# Patient Record
Sex: FEMALE | Race: WHITE | NOT HISPANIC OR LATINO | Employment: OTHER | URBAN - METROPOLITAN AREA
[De-identification: names, ages, dates, MRNs, and addresses within clinical notes are randomized per-mention and may not be internally consistent; named-entity substitution may affect disease eponyms.]

---

## 2017-01-30 RX ORDER — DOXAZOSIN 2 MG/1
2 TABLET ORAL DAILY
Qty: 30 TABLET | Refills: 6 | Status: SHIPPED | OUTPATIENT
Start: 2017-01-30 | End: 2017-01-30 | Stop reason: SDUPTHER

## 2017-01-30 RX ORDER — ROSUVASTATIN CALCIUM 10 MG/1
10 TABLET, COATED ORAL DAILY
Qty: 30 TABLET | Refills: 6 | Status: SHIPPED | OUTPATIENT
Start: 2017-01-30 | End: 2017-02-01 | Stop reason: SDUPTHER

## 2017-01-30 RX ORDER — RAMIPRIL 10 MG/1
10 CAPSULE ORAL 2 TIMES DAILY
Qty: 60 CAPSULE | Refills: 6 | Status: SHIPPED | OUTPATIENT
Start: 2017-01-30 | End: 2017-05-30 | Stop reason: SDUPTHER

## 2017-01-30 RX ORDER — DOXAZOSIN 2 MG/1
TABLET ORAL DAILY
COMMUNITY
Start: 2016-02-02 | End: 2017-01-30 | Stop reason: SDUPTHER

## 2017-02-01 RX ORDER — ROSUVASTATIN CALCIUM 10 MG/1
TABLET, COATED ORAL
Qty: 30 TABLET | Refills: 6 | Status: SHIPPED | OUTPATIENT
Start: 2017-02-01 | End: 2017-09-05 | Stop reason: SDUPTHER

## 2017-02-01 RX ORDER — DOXAZOSIN 2 MG/1
TABLET ORAL
Qty: 90 TABLET | Refills: 3 | Status: SHIPPED | OUTPATIENT
Start: 2017-02-01 | End: 2017-09-25 | Stop reason: SDUPTHER

## 2017-02-01 RX ORDER — RAMIPRIL 10 MG/1
CAPSULE ORAL
Qty: 60 CAPSULE | Refills: 7 | Status: SHIPPED | OUTPATIENT
Start: 2017-02-01 | End: 2017-02-20

## 2017-02-20 ENCOUNTER — OFFICE VISIT (OUTPATIENT)
Dept: CARDIOLOGY | Facility: CLINIC | Age: 82
End: 2017-02-20

## 2017-02-20 VITALS
WEIGHT: 168 LBS | SYSTOLIC BLOOD PRESSURE: 174 MMHG | DIASTOLIC BLOOD PRESSURE: 80 MMHG | HEIGHT: 63 IN | HEART RATE: 74 BPM | BODY MASS INDEX: 29.77 KG/M2

## 2017-02-20 DIAGNOSIS — R00.2 PALPITATIONS: ICD-10-CM

## 2017-02-20 DIAGNOSIS — I25.10 CORONARY ARTERY DISEASE INVOLVING NATIVE CORONARY ARTERY OF NATIVE HEART WITHOUT ANGINA PECTORIS: Primary | ICD-10-CM

## 2017-02-20 DIAGNOSIS — I10 BENIGN HYPERTENSION: ICD-10-CM

## 2017-02-20 PROCEDURE — 99213 OFFICE O/P EST LOW 20 MIN: CPT | Performed by: PHYSICIAN ASSISTANT

## 2017-02-20 RX ORDER — DICLOFENAC SODIUM 75 MG/1
75 TABLET, DELAYED RELEASE ORAL AS NEEDED
COMMUNITY
End: 2018-10-15

## 2017-02-20 RX ORDER — LEVOTHYROXINE AND LIOTHYRONINE 38; 9 UG/1; UG/1
60 TABLET ORAL DAILY
COMMUNITY
Start: 2015-12-21

## 2017-02-20 RX ORDER — HYDROCHLOROTHIAZIDE 25 MG/1
25 TABLET ORAL DAILY
COMMUNITY
End: 2019-07-22

## 2017-02-20 NOTE — PROGRESS NOTES
[facility] - Office Note  Noé Patton         [address] [place] [address]  1935   [phone] (home)      LOCATION:  [facility].  Visit Type: Follow Up.    PCP:  Keron Rodarte MD    02/20/17   Noé Patton is a 82 y.o.  female  retired.      Chief Complaint: Follow up on CAD, HTN.  PROBLEM LIST:  1. Coronary artery disease:  a. ACS status post catheterization, 01/29/2012: Two 3.5 x 28 mm Xience drug-eluting stents of the proximal RCA, EF 30%.  b. Echocardiogram, 03/12/2012: EF 50% to 55%. Mild MR and moderate TR, RVSP at 46, left atrium 3.8 cm.  c. Cessation of Plavix secondary to epistaxis.  d. Echocardiogram 02/09/2016: EF 55% to 60%, mild MR, mild TR, no AS.   e. Nuclear Cardiolite 02/11/2016: No evidence of inducible ischemia, normal LV function.   2. Benign hypertension.  3. Type 2 diabetes mellitus.  4. Hypothyroidism.  5. History of tobacco, quitting several years ago.  6. Family history.  7. Palpitations:   a. Holter monitor 01/21/2016, showing PACs, PVCs, but no arrhythmias or pauses.   8. Osteoarthritis.  9. Surgical history:  a. Benign left breast biopsy.  b. Right cataract.         No Known Allergies      Current Outpatient Prescriptions:   •  doxazosin (CARDURA) 2 MG tablet, take 1 tablet by mouth once daily as directed, Disp: 90 tablet, Rfl: 3  •  metoprolol tartrate (LOPRESSOR) 25 MG tablet, Take 1 tablet by mouth 2 (Two) Times a Day., Disp: 180 tablet, Rfl: 1  •  metoprolol tartrate (LOPRESSOR) 25 MG tablet, take 1 tablet by mouth twice a day, Disp: 180 tablet, Rfl: 0  •  ramipril (ALTACE) 10 MG capsule, Take 1 capsule by mouth 2 (Two) Times a Day., Disp: 60 capsule, Rfl: 6  •  ramipril (ALTACE) 10 MG capsule, take 1 capsule by mouth twice a day, Disp: 60 capsule, Rfl: 7  •  rosuvastatin (CRESTOR) 10 MG tablet, take 1 tablet by mouth once daily, Disp: 30 tablet, Rfl: 6    HPI  Ms. Patton is here for routine follow-up on her  "heart disease, hypertension and palpitations.  She is done fairly well from a cardiac standpoint.  She still has palpitations although there are very minimal.  She denies any dizziness or syncopal events.  She states her blood pressure continues to remain high at home, usually running 140s to 150s systolically.  She has other noncardiac-related complaints such as arthritis, and intends to see her PCP for this.  She has had some swelling of her lower extremities over the last few months.  She has no weeping, no sores or ulcerations.      The following portions of the patient's history were reviewed in the chart and updated as appropriate: allergies, current medications, past family history, past medical history, past social history, past surgical history and problem list.    Review of Systems   Cardiovascular: Positive for leg swelling and palpitations.   All other systems reviewed and are negative.        height is 63\" (160 cm) and weight is 168 lb (76.2 kg). Her blood pressure is 174/80 and her pulse is 74.   Physical Exam   Constitutional: Vital signs are normal. She appears well-developed and well-nourished.   Cardiovascular: Normal rate, regular rhythm, S1 normal, S2 normal, normal heart sounds, intact distal pulses and normal pulses.    Pulmonary/Chest: Effort normal and breath sounds normal. She has no wheezes. She has no rhonchi. She has no rales.   Abdominal: Soft. Normal appearance and bowel sounds are normal. There is no hepatosplenomegaly.   Neurological: She is alert.         Procedures     Assessment/ Plan     Coronary artery disease involving native coronary artery of native heart without angina pectoris:  Stable and asymptomatic.  Continue statin therapy with routine bloodwork by PCP.  RTC 6 months or sooner PRN.    Benign hypertension:  Poorly controlled.  I offered to increase her diuretic with the complaint of increased swelling and poorly controlled hypertension.  She is uneasy about this.  " Increase altace to 20 mg po daily as she believes she is only taking a total of 10 mg daily despite the med rec same twice a day.  Keep all other meds as directed.  Keep BP log x 1 week and we will call pt to discuss.  I anticipate her needing better control - consider increasing cardura or discuss increasing beta blocker although i do not think she will tolerate the fatigue.    Palpitations:  Stable. Continue beta blocker, continue to monitor.    Mariposa Dougherty PA-C  2/20/2017 10:14 AM      EMR Dragon/Transcription disclaimer:   Much of this encounter note is an electronic transcription/translation of spoken language to printed text. The electronic translation of spoken language may permit erroneous, or at times, nonsensical words or phrases to be inadvertently transcribed; Although I have reviewed the note for such errors, some may still exist.

## 2017-02-27 ENCOUNTER — TELEPHONE (OUTPATIENT)
Dept: CARDIOLOGY | Facility: CLINIC | Age: 82
End: 2017-02-27

## 2017-02-27 NOTE — TELEPHONE ENCOUNTER
Spoke with Patient to check her B/P readings, 2/21 /60, PM, 163/69,  2/22 /62, /63,  2/23 /56, /66, 2/24  /58,  PM  128/58, 2/25 / 53,  /54,  2/26 /68, before medication,  /59,  2/27 /61.   Currently taking Ramipril 20 mg Q AM.  No changes for now  per Mariposa Case. Patient to call our office with any concerns or problems. Patient verbalized understanding.

## 2017-05-30 RX ORDER — RAMIPRIL 10 MG/1
10 CAPSULE ORAL 2 TIMES DAILY
Qty: 60 CAPSULE | Refills: 0 | Status: SHIPPED | OUTPATIENT
Start: 2017-05-30 | End: 2017-06-27 | Stop reason: SDUPTHER

## 2017-06-27 RX ORDER — RAMIPRIL 10 MG/1
10 CAPSULE ORAL 2 TIMES DAILY
Qty: 60 CAPSULE | Refills: 3 | Status: SHIPPED | OUTPATIENT
Start: 2017-06-27 | End: 2017-09-25 | Stop reason: SDUPTHER

## 2017-09-05 RX ORDER — ROSUVASTATIN CALCIUM 10 MG/1
10 TABLET, COATED ORAL DAILY
Qty: 30 TABLET | Refills: 6 | Status: SHIPPED | OUTPATIENT
Start: 2017-09-05 | End: 2018-03-15 | Stop reason: SDUPTHER

## 2017-09-25 ENCOUNTER — OFFICE VISIT (OUTPATIENT)
Dept: CARDIOLOGY | Facility: CLINIC | Age: 82
End: 2017-09-25

## 2017-09-25 VITALS
HEIGHT: 63 IN | SYSTOLIC BLOOD PRESSURE: 140 MMHG | HEART RATE: 73 BPM | DIASTOLIC BLOOD PRESSURE: 66 MMHG | WEIGHT: 163.4 LBS | BODY MASS INDEX: 28.95 KG/M2

## 2017-09-25 DIAGNOSIS — I10 BENIGN HYPERTENSION: ICD-10-CM

## 2017-09-25 DIAGNOSIS — I25.10 CORONARY ARTERY DISEASE INVOLVING NATIVE CORONARY ARTERY OF NATIVE HEART WITHOUT ANGINA PECTORIS: Primary | ICD-10-CM

## 2017-09-25 DIAGNOSIS — E78.2 MIXED HYPERLIPIDEMIA: ICD-10-CM

## 2017-09-25 PROCEDURE — 99214 OFFICE O/P EST MOD 30 MIN: CPT | Performed by: INTERNAL MEDICINE

## 2017-09-25 RX ORDER — DOXAZOSIN 2 MG/1
2 TABLET ORAL NIGHTLY
Qty: 90 TABLET | Refills: 3 | Status: SHIPPED | OUTPATIENT
Start: 2017-09-25 | End: 2018-10-15

## 2017-09-25 RX ORDER — RAMIPRIL 10 MG/1
10 CAPSULE ORAL 2 TIMES DAILY
Qty: 180 CAPSULE | Refills: 3 | Status: SHIPPED | OUTPATIENT
Start: 2017-09-25 | End: 2020-08-03

## 2017-09-25 NOTE — PROGRESS NOTES
[facility] [place]  Office visit  Noé Patton  1935  [phone]    VISIT DATE:  09/25/2017    PCP: Keron Rodarte MD  [address] [facility] [address]    CC:  Chief Complaint   Patient presents with   • Coronary Artery Disease     follow up       PROBLEM LIST:  1. Coronary artery disease:  a. ACS status post catheterization, 01/29/2012: Two 3.5 x 28 mm Xience drug-eluting stents of the proximal RCA, EF 30%.  b. Echocardiogram, 03/12/2012: EF 50% to 55%. Mild MR and moderate TR, RVSP at 46, left atrium 3.8 cm.  c. Cessation of Plavix secondary to epistaxis.  d. Echocardiogram 02/09/2016: EF 55% to 60%, mild MR, mild TR, no AS.   e. Nuclear Cardiolite 02/11/2016: No evidence of inducible ischemia, normal LV function.   2. Benign hypertension.  3. Type 2 diabetes mellitus.  4. Hypothyroidism.  5. History of tobacco, quitting several years ago.  6. Family history.  7. Palpitations:   a. Holter monitor 01/21/2016, showing PACs, PVCs, but no arrhythmias or pauses.   8. Osteoarthritis.  9. Surgical history:  a. Benign left breast biopsy.  b. Right cataract      ASSESSMENT:   Diagnosis Plan   1. Coronary artery disease involving native coronary artery of native heart without angina pectoris     2. Benign hypertension     3. Mixed hyperlipidemia         PLAN:  Coronary artery disease: Stable.  Continue aspirin, beta blockade and statin therapy.    Hypertension:: Less than 140/90.  Currently controlled.  Continue combination of Cardura, hydrochlorothiazide, metoprolol and ace inhibitor    Crestor: Goal LDL less than 100.  Currently controlled.  Continue Crestor 10 mg by mouth daily.    Subjective  Reports stable functional capacity.  Denies chest pain, palpitations, or dyspnea on exertion.  Compliant with medical therapy.  Blood pressures running less than 140/90 mmHg.  Has noticed some mild intermittent calf myalgias since switching to generic rosuvastatin.  Currently not  "limiting her functional capacity.  Denies easy bruising or bleeding complications on aspirin.  Reviewed recent fasting lipid panel.        PHYSICAL EXAMINATION:  Vitals:    09/25/17 1113   BP: 140/66   BP Location: Right arm   Patient Position: Sitting   Pulse: 73   Weight: 163 lb 6.4 oz (74.1 kg)   Height: 63\" (160 cm)     General Appearance:    Alert, cooperative, no distress, appears stated age   Head:    Normocephalic, without obvious abnormality, atraumatic   Eyes:    conjunctiva/corneas clear   Nose:   Nares normal, septum midline, mucosa normal, no drainage   Throat:   Lips, teeth and gums normal   Neck:   Supple, symmetrical, trachea midline, no carotid    bruit or JVD   Lungs:     Clear to auscultation bilaterally, respirations unlabored   Chest Wall:    No tenderness or deformity    Heart:    Regular rate and rhythm, S1 and S2 normal, 2/6 early peaking systolic murmur right upper sternal border, rub   or gallop, normal carotid impulse bilaterally without bruit.   Abdomen:     Soft, non-tender   Extremities:   Extremities normal, atraumatic, no cyanosis or edema   Pulses:   2+ and symmetric all extremities   Skin:   Skin color, texture, turgor normal, no rashes or lesions, Spider veins noted on bilateral lower extremities.         Diagnostic Data:  Procedures  No results found for: CHLPL, TRIG, HDL, LDLDIRECT  No results found for: GLUCOSE, BUN, CREATININE, NA, K, CL, CO2, CREATININE, BUN  No results found for: HGBA1C  No results found for: WBC, HGB, HCT, PLT    Allergies  No Known Allergies    Current Medications    Current Outpatient Prescriptions:   •  aspirin 81 MG tablet, Take 81 mg by mouth Daily., Disp: , Rfl:   •  diclofenac (VOLTAREN) 75 MG EC tablet, Take 75 mg by mouth As Needed., Disp: , Rfl:   •  doxazosin (CARDURA) 2 MG tablet, Take 1 tablet by mouth Every Night., Disp: 90 tablet, Rfl: 3  •  hydrochlorothiazide (HYDRODIURIL) 25 MG tablet, Take 25 mg by mouth Daily., Disp: , Rfl:   •  metFORMIN " (GLUCOPHAGE) 1000 MG tablet, Take 1,000 mg by mouth 2 (Two) Times a Day With Meals., Disp: , Rfl:   •  metoprolol tartrate (LOPRESSOR) 25 MG tablet, Take 1 tablet by mouth 2 (Two) Times a Day., Disp: 180 tablet, Rfl: 0  •  ramipril (ALTACE) 10 MG capsule, Take 1 capsule by mouth 2 (Two) Times a Day., Disp: 180 capsule, Rfl: 3  •  rosuvastatin (CRESTOR) 10 MG tablet, Take 1 tablet by mouth Daily., Disp: 30 tablet, Rfl: 6  •  Thyroid (ARMOUR THYROID) 60 MG PO tablet, Take 60 mg by mouth Daily., Disp: , Rfl:           ROS  Review of Systems   Cardiovascular: Positive for leg swelling. Negative for chest pain, claudication and dyspnea on exertion.   Respiratory: Negative for cough and shortness of breath.          SOCIAL HX  Social History     Social History   • Marital status:      Spouse name: N/A   • Number of children: N/A   • Years of education: N/A     Occupational History   • Not on file.     Social History Main Topics   • Smoking status: Never Smoker   • Smokeless tobacco: Never Used   • Alcohol use No   • Drug use: No   • Sexual activity: Defer     Other Topics Concern   • Not on file     Social History Narrative       FAMILY HX  Family History   Problem Relation Age of Onset   • Cancer Mother    • Heart attack Father              Josef Hamilton III, MD, FACC

## 2018-03-15 RX ORDER — ROSUVASTATIN CALCIUM 10 MG/1
10 TABLET, COATED ORAL DAILY
Qty: 30 TABLET | Refills: 3 | Status: SHIPPED | OUTPATIENT
Start: 2018-03-15 | End: 2018-07-16 | Stop reason: SDUPTHER

## 2018-04-11 ENCOUNTER — OFFICE VISIT (OUTPATIENT)
Dept: CARDIOLOGY | Facility: CLINIC | Age: 83
End: 2018-04-11

## 2018-04-11 VITALS
HEART RATE: 67 BPM | HEIGHT: 63 IN | DIASTOLIC BLOOD PRESSURE: 60 MMHG | SYSTOLIC BLOOD PRESSURE: 144 MMHG | BODY MASS INDEX: 28 KG/M2 | WEIGHT: 158 LBS

## 2018-04-11 DIAGNOSIS — E78.2 MIXED HYPERLIPIDEMIA: ICD-10-CM

## 2018-04-11 DIAGNOSIS — I10 BENIGN HYPERTENSION: ICD-10-CM

## 2018-04-11 DIAGNOSIS — I25.10 CORONARY ARTERY DISEASE INVOLVING NATIVE CORONARY ARTERY OF NATIVE HEART WITHOUT ANGINA PECTORIS: Primary | ICD-10-CM

## 2018-04-11 DIAGNOSIS — R00.2 PALPITATIONS: ICD-10-CM

## 2018-04-11 PROCEDURE — 99214 OFFICE O/P EST MOD 30 MIN: CPT | Performed by: INTERNAL MEDICINE

## 2018-04-11 RX ORDER — AMLODIPINE BESYLATE 2.5 MG/1
2.5 TABLET ORAL DAILY
Qty: 90 TABLET | Refills: 3 | Status: SHIPPED | OUTPATIENT
Start: 2018-04-11 | End: 2018-10-15

## 2018-04-11 NOTE — PROGRESS NOTES
[facility] at [facility]  Office visit  Noé Patton  1935  [phone]    VISIT DATE:  09/25/2017    PCP: Fatimah Mensah MD  [address] [place] [address] [address] [place] [address]    CC:  Chief Complaint   Patient presents with   • Coronary Artery Disease       PROBLEM LIST:  1. Coronary artery disease:  a. ACS status post catheterization, 01/29/2012: Two 3.5 x 28 mm Xience drug-eluting stents of the proximal RCA, EF 30%.  b. Echocardiogram, 03/12/2012: EF 50% to 55%. Mild MR and moderate TR, RVSP at 46, left atrium 3.8 cm.  c. Cessation of Plavix secondary to epistaxis.  d. Echocardiogram 02/09/2016: EF 55% to 60%, mild MR, mild TR, no AS.   e. Nuclear Cardiolite 02/11/2016: No evidence of inducible ischemia, normal LV function.   2. Benign hypertension.  3. Type 2 diabetes mellitus.  4. Hypothyroidism.  5. History of tobacco, quitting several years ago.  6. Family history.  7. Palpitations:   a. Holter monitor 01/21/2016, showing PACs, PVCs, but no arrhythmias or pauses.   8. Osteoarthritis.  9. Surgical history:  a. Benign left breast biopsy.  b. Right cataract      ASSESSMENT:   Diagnosis Plan   1. Coronary artery disease involving native coronary artery of native heart without angina pectoris     2. Benign hypertension     3. Palpitations     4. Mixed hyperlipidemia         PLAN:  Coronary artery disease: Continue aspirin, beta blockade and statin therapy.Symptoms consistent with mild intermittent class II angina.  Starting amlodipine 2.5 mg by mouth daily.  We'll proceed with more definitive evaluation of coronary anatomy if symptoms do not respond to titration of antianginals.    Hypertension:: Goal less than 130/80 mmHg.  Currently controlled.  Continue combination of Cardura, hydrochlorothiazide, metoprolol and ace inhibitor.  Decreasing Advicor thiazide 12.5 mg by mouth daily.  Adding amlodipine.    Crestor: Goal LDL less than 100.  Currently controlled.  Continue Crestor 10 mg by  "mouth daily.    Subjective  Reports stable functional capacity.  Denies palpitations, or dyspnea on exertion.  Compliant with medical therapy.  Blood pressures running less than 140/90 mmHg.  Has noticed some mild intermittent calf myalgias since switching to generic rosuvastatin, Also feels like she developed some muscle cramping with hydrochlorothiazide at the 25 mg dose, appears to be improved when she takes half a tablet..    Denies easy bruising or bleeding complications on aspirin.  Reviewed recent fasting lipid panel.  Has noticed some intermittent left-sided mild chest pressure which is activities as rushing across the parking lot, resolved with rest with less than 2 minutes.  Occurring intermittently over the previous 3-4 months.       PHYSICAL EXAMINATION:  Vitals:    04/11/18 1142   BP: 144/60   BP Location: Right arm   Patient Position: Sitting   Pulse: 67   Weight: 71.7 kg (158 lb)   Height: 160 cm (63\")     General Appearance:    Alert, cooperative, no distress, appears stated age   Head:    Normocephalic, without obvious abnormality, atraumatic   Eyes:    conjunctiva/corneas clear   Nose:   Nares normal, septum midline, mucosa normal, no drainage   Throat:   Lips, teeth and gums normal   Neck:   Supple, symmetrical, trachea midline, no carotid    bruit or JVD   Lungs:     Clear to auscultation bilaterally, respirations unlabored   Chest Wall:    No tenderness or deformity    Heart:    Regular rate and rhythm, S1 and S2 normal, 2/6 early peaking systolic murmur right upper sternal border, rub   or gallop, normal carotid impulse bilaterally without bruit.   Abdomen:     Soft, non-tender   Extremities:   Extremities normal, atraumatic, no cyanosis or edema   Pulses:   2+ and symmetric all extremities   Skin:   Skin color, texture, turgor normal, no rashes or lesions, Spider veins noted on bilateral lower extremities.         Diagnostic Data:  Procedures  No results found for: CHLPL, TRIG, HDL, " LDLDIRECT  No results found for: GLUCOSE, BUN, CREATININE, NA, K, CL, CO2, CREATININE, BUN  No results found for: HGBA1C  No results found for: WBC, HGB, HCT, PLT    Allergies  No Known Allergies    Current Medications    Current Outpatient Prescriptions:   •  aspirin 81 MG tablet, Take 81 mg by mouth Daily., Disp: , Rfl:   •  diclofenac (VOLTAREN) 75 MG EC tablet, Take 75 mg by mouth As Needed., Disp: , Rfl:   •  doxazosin (CARDURA) 2 MG tablet, Take 1 tablet by mouth Every Night., Disp: 90 tablet, Rfl: 3  •  hydrochlorothiazide (HYDRODIURIL) 25 MG tablet, Take 25 mg by mouth Daily., Disp: , Rfl:   •  metFORMIN (GLUCOPHAGE) 1000 MG tablet, Take 1,000 mg by mouth 2 (Two) Times a Day With Meals., Disp: , Rfl:   •  metoprolol tartrate (LOPRESSOR) 25 MG tablet, Take 1 tablet by mouth Every 12 (Twelve) Hours., Disp: 180 tablet, Rfl: 0  •  ramipril (ALTACE) 10 MG capsule, Take 1 capsule by mouth 2 (Two) Times a Day., Disp: 180 capsule, Rfl: 3  •  rosuvastatin (CRESTOR) 10 MG tablet, Take 1 tablet by mouth Daily., Disp: 30 tablet, Rfl: 3  •  Thyroid (ARMOUR THYROID) 60 MG PO tablet, Take 60 mg by mouth Daily., Disp: , Rfl:           ROS  Review of Systems   Cardiovascular: Positive for leg swelling. Negative for chest pain, claudication and dyspnea on exertion.   Respiratory: Negative for cough and shortness of breath.          SOCIAL HX  Social History     Social History   • Marital status:      Spouse name: N/A   • Number of children: N/A   • Years of education: N/A     Occupational History   • Not on file.     Social History Main Topics   • Smoking status: Never Smoker   • Smokeless tobacco: Never Used   • Alcohol use No   • Drug use: No   • Sexual activity: Defer     Other Topics Concern   • Not on file     Social History Narrative   • No narrative on file       FAMILY HX  Family History   Problem Relation Age of Onset   • Cancer Mother    • Heart attack Father              Josef Hamilton III, MD, [facility]

## 2018-07-16 RX ORDER — ROSUVASTATIN CALCIUM 10 MG/1
10 TABLET, COATED ORAL DAILY
Qty: 30 TABLET | Refills: 3 | Status: SHIPPED | OUTPATIENT
Start: 2018-07-16

## 2018-10-15 ENCOUNTER — OFFICE VISIT (OUTPATIENT)
Dept: CARDIOLOGY | Facility: CLINIC | Age: 83
End: 2018-10-15

## 2018-10-15 VITALS
HEART RATE: 70 BPM | OXYGEN SATURATION: 97 % | SYSTOLIC BLOOD PRESSURE: 160 MMHG | BODY MASS INDEX: 29.84 KG/M2 | DIASTOLIC BLOOD PRESSURE: 60 MMHG | HEIGHT: 60 IN | WEIGHT: 152 LBS

## 2018-10-15 DIAGNOSIS — I25.10 CORONARY ARTERY DISEASE INVOLVING NATIVE CORONARY ARTERY OF NATIVE HEART WITHOUT ANGINA PECTORIS: Primary | ICD-10-CM

## 2018-10-15 DIAGNOSIS — I10 BENIGN HYPERTENSION: ICD-10-CM

## 2018-10-15 DIAGNOSIS — E78.2 MIXED HYPERLIPIDEMIA: ICD-10-CM

## 2018-10-15 PROCEDURE — 99214 OFFICE O/P EST MOD 30 MIN: CPT | Performed by: INTERNAL MEDICINE

## 2018-10-15 RX ORDER — AMLODIPINE BESYLATE 5 MG/1
5 TABLET ORAL DAILY
Qty: 30 TABLET | Refills: 11 | Status: SHIPPED | OUTPATIENT
Start: 2018-10-15 | End: 2020-08-03

## 2018-10-15 NOTE — PROGRESS NOTES
[facility]  Office visit  Noé Patton  1935  [phone]    VISIT DATE:  09/25/2017    PCP: Fatimah Mensah MD  [address] [place] [address] [address] [place] [address]    CC:  Chief Complaint   Patient presents with   • Coronary Artery Disease       PROBLEM LIST:  1. Coronary artery disease:  a. ACS status post catheterization, 01/29/2012: Two 3.5 x 28 mm Xience drug-eluting stents of the proximal RCA, EF 30%.  b. Echocardiogram, 03/12/2012: EF 50% to 55%. Mild MR and moderate TR, RVSP at 46, left atrium 3.8 cm.  c. Cessation of Plavix secondary to epistaxis.  d. Echocardiogram 02/09/2016: EF 55% to 60%, mild MR, mild TR, no AS.   e. Nuclear Cardiolite 02/11/2016: No evidence of inducible ischemia, normal LV function.   2. Benign hypertension.  3. Type 2 diabetes mellitus.  4. Hypothyroidism.  5. History of tobacco, quitting several years ago.  6. Family history.  7. Palpitations:   a. Holter monitor 01/21/2016, showing PACs, PVCs, but no arrhythmias or pauses.   8. Osteoarthritis.  9. Surgical history:  a. Benign left breast biopsy.  b. Right cataract      ASSESSMENT:   Diagnosis Plan   1. Coronary artery disease involving native coronary artery of native heart without angina pectoris     2. Benign hypertension     3. Mixed hyperlipidemia         PLAN:  Coronary artery disease: Continue aspirin, beta blockade and statin therapy.Symptoms consistent with mild intermittent class II angina.  Increasing amlodipine to 5mg by mouth daily.  We'll proceed with more definitive evaluation of coronary anatomy if symptoms do not respond to titration of antianginals.  Follow-up clinical response in 3 months.    Hypertension:: Goal less than 130/80 mmHg.  Currently controlled.  Continue combination of Cardura, hydrochlorothiazide, amlodipine, metoprolol and ace inhibitor.      Crestor: Goal LDL less than 100.  Currently controlled.  Continue Crestor 10 mg by mouth daily.    Subjective  Reports  "stable functional capacity.  Denies palpitations, or dyspnea on exertion.  Compliant with medical therapy.  Blood pressures running less than 140/90 mmHg.  muscle cramping resolved on lower dose hydrochlorothiazide.    Denies easy bruising or bleeding complications on aspirin.  Reviewed recent fasting lipid panel.  Persistent intermittent left-sided mild chest pressure in a stable class II pattern, only notices it when she is in a rush.  Resolves at rest easily..       PHYSICAL EXAMINATION:  Vitals:    10/15/18 1053   BP: 160/60   BP Location: Right arm   Patient Position: Sitting   Pulse: 70   SpO2: 97%   Weight: 68.9 kg (152 lb)   Height: 152.4 cm (60\")     General Appearance:    Alert, cooperative, no distress, appears stated age   Head:    Normocephalic, without obvious abnormality, atraumatic   Eyes:    conjunctiva/corneas clear   Nose:   Nares normal, septum midline, mucosa normal, no drainage   Throat:   Lips, teeth and gums normal   Neck:   Supple, symmetrical, trachea midline, no carotid    bruit or JVD   Lungs:     Clear to auscultation bilaterally, respirations unlabored   Chest Wall:    No tenderness or deformity    Heart:    Regular rate and rhythm, S1 and S2 normal, 2/6 early peaking systolic murmur right upper sternal border, rub   or gallop, normal carotid impulse bilaterally without bruit.   Abdomen:     Soft, non-tender   Extremities:   Extremities normal, atraumatic, no cyanosis or edema   Pulses:   2+ and symmetric all extremities   Skin:   Skin color, texture, turgor normal, no rashes or lesions, Spider veins noted on bilateral lower extremities.         Diagnostic Data:  Procedures  No results found for: CHLPL, TRIG, HDL, LDLDIRECT  No results found for: GLUCOSE, BUN, CREATININE, NA, K, CL, CO2, CREATININE, BUN  No results found for: HGBA1C  No results found for: WBC, HGB, HCT, PLT    Allergies  No Known Allergies    Current Medications    Current Outpatient Prescriptions:   •  amLODIPine " (NORVASC) 2.5 MG tablet, Take 1 tablet by mouth Daily., Disp: 90 tablet, Rfl: 3  •  aspirin 81 MG tablet, Take 81 mg by mouth Daily., Disp: , Rfl:   •  hydrochlorothiazide (HYDRODIURIL) 25 MG tablet, Take 25 mg by mouth Daily., Disp: , Rfl:   •  metoprolol tartrate (LOPRESSOR) 25 MG tablet, Take 1 tablet by mouth Every 12 (Twelve) Hours., Disp: 180 tablet, Rfl: 0  •  ramipril (ALTACE) 10 MG capsule, Take 1 capsule by mouth 2 (Two) Times a Day., Disp: 180 capsule, Rfl: 3  •  rosuvastatin (CRESTOR) 10 MG tablet, Take 1 tablet by mouth Daily., Disp: 30 tablet, Rfl: 3  •  Thyroid (ARMOUR THYROID) 60 MG PO tablet, Take 60 mg by mouth Daily., Disp: , Rfl:           ROS  Review of Systems   Cardiovascular: Positive for leg swelling. Negative for chest pain, claudication and dyspnea on exertion.   Respiratory: Positive for cough. Negative for shortness of breath.          SOCIAL HX  Social History     Social History   • Marital status:      Spouse name: N/A   • Number of children: N/A   • Years of education: N/A     Occupational History   • Not on file.     Social History Main Topics   • Smoking status: Never Smoker   • Smokeless tobacco: Never Used   • Alcohol use No   • Drug use: No   • Sexual activity: Defer     Other Topics Concern   • Not on file     Social History Narrative   • No narrative on file       FAMILY HX  Family History   Problem Relation Age of Onset   • Cancer Mother    • Heart attack Father              Josef Hamilton III, MD, [facility]

## 2019-01-21 ENCOUNTER — OFFICE VISIT (OUTPATIENT)
Dept: CARDIOLOGY | Facility: CLINIC | Age: 84
End: 2019-01-21

## 2019-01-21 VITALS
BODY MASS INDEX: 27.64 KG/M2 | WEIGHT: 156 LBS | HEART RATE: 70 BPM | OXYGEN SATURATION: 96 % | HEIGHT: 63 IN | DIASTOLIC BLOOD PRESSURE: 68 MMHG | SYSTOLIC BLOOD PRESSURE: 140 MMHG

## 2019-01-21 DIAGNOSIS — I10 BENIGN HYPERTENSION: ICD-10-CM

## 2019-01-21 DIAGNOSIS — R07.2 PRECORDIAL PAIN: ICD-10-CM

## 2019-01-21 DIAGNOSIS — E78.2 MIXED HYPERLIPIDEMIA: ICD-10-CM

## 2019-01-21 DIAGNOSIS — I25.10 CORONARY ARTERY DISEASE INVOLVING NATIVE CORONARY ARTERY OF NATIVE HEART WITHOUT ANGINA PECTORIS: Primary | ICD-10-CM

## 2019-01-21 PROCEDURE — 99214 OFFICE O/P EST MOD 30 MIN: CPT | Performed by: INTERNAL MEDICINE

## 2019-01-21 NOTE — PROGRESS NOTES
[facility]  Office visit  Noé Patton  1935  [phone]    VISIT DATE:  09/25/2017    PCP: Fatimah Mensah MD  [address] [place] [address] [address] [place] [address]    CC:  Chief Complaint   Patient presents with   • Coronary Artery Disease       PROBLEM LIST:  1. Coronary artery disease:  a. ACS status post catheterization, 01/29/2012: Two 3.5 x 28 mm Xience drug-eluting stents of the proximal RCA, EF 30%.  b. Echocardiogram, 03/12/2012: EF 50% to 55%. Mild MR and moderate TR, RVSP at 46, left atrium 3.8 cm.  c. Cessation of Plavix secondary to epistaxis.  d. Echocardiogram 02/09/2016: EF 55% to 60%, mild MR, mild TR, no AS.   e. Nuclear Cardiolite 02/11/2016: No evidence of inducible ischemia, normal LV function.   2. Benign hypertension.  3. Type 2 diabetes mellitus.  4. Hypothyroidism.  5. History of tobacco, quitting several years ago.  6. Family history.  7. Palpitations:   a. Holter monitor 01/21/2016, showing PACs, PVCs, but no arrhythmias or pauses.   8. Osteoarthritis.  9. Surgical history:  a. Benign left breast biopsy.  b. Right cataract      ASSESSMENT:   Diagnosis Plan   1. Coronary artery disease involving native coronary artery of native heart without angina pectoris     2. Benign hypertension     3. Mixed hyperlipidemia         PLAN:  Coronary artery disease: Continue aspirin, beta blockade and statin therapy.Symptoms consistent class II angina.  Did not tolerate titration of antianginal therapy.  We'll proceed with more definitive evaluation of coronary anatomy if myocardial perfusion imaging is moderate to high risk in nature or if symptoms accelerate.      Hypertension:: Goal less than 130/80 mmHg.  Currently controlled.  Continue combination of Cardura, hydrochlorothiazide, amlodipine, metoprolol and ace inhibitor.      Crestor: Goal LDL less than 100.  Currently controlled.  Continue Crestor 10 mg by mouth daily.    Subjective  Reports stable functional  "capacity.  Denies palpitations, or dyspnea on exertion.  Compliant with medical therapy.  Blood pressures running less than 140/90 mmHg.  Only rare episodes of muscle cramping on lower dose thiazide diuretic.    Denies easy bruising or bleeding complications on aspirin.  Persistent intermittent left-sided mild chest pressure in a stable class II pattern, only notices it when she is in a rush.  Resolves at rest easily.  Developed flushing on higher dose of amlodipine.     PHYSICAL EXAMINATION:  Vitals:    01/21/19 1108   BP: 140/68   BP Location: Left arm   Patient Position: Sitting   Pulse: 70   SpO2: 96%   Weight: 70.8 kg (156 lb)   Height: 160 cm (63\")     General Appearance:    Alert, cooperative, no distress, appears stated age   Head:    Normocephalic, without obvious abnormality, atraumatic   Eyes:    conjunctiva/corneas clear   Nose:   Nares normal, septum midline, mucosa normal, no drainage   Throat:   Lips, teeth and gums normal   Neck:   Supple, symmetrical, trachea midline, no carotid    bruit or JVD   Lungs:     Clear to auscultation bilaterally, respirations unlabored   Chest Wall:    No tenderness or deformity    Heart:    Regular rate and rhythm, S1 and S2 normal, 2/6 early peaking systolic murmur right upper sternal border, rub   or gallop, normal carotid impulse bilaterally without bruit.   Abdomen:     Soft, non-tender   Extremities:   Extremities normal, atraumatic, no cyanosis or edema   Pulses:   2+ and symmetric all extremities   Skin:   Skin color, texture, turgor normal, no rashes or lesions, Spider veins noted on bilateral lower extremities.         Diagnostic Data:  Procedures  No results found for: CHLPL, TRIG, HDL, LDLDIRECT  No results found for: GLUCOSE, BUN, CREATININE, NA, K, CL, CO2, CREATININE, BUN  No results found for: HGBA1C  No results found for: WBC, HGB, HCT, PLT    Allergies  No Known Allergies    Current Medications    Current Outpatient Medications:   •  amLODIPine " (NORVASC) 5 MG tablet, Take 1 tablet by mouth Daily. (Patient taking differently: Take 2.5 mg by mouth Daily.), Disp: 30 tablet, Rfl: 11  •  aspirin 81 MG tablet, Take 81 mg by mouth Daily., Disp: , Rfl:   •  hydrochlorothiazide (HYDRODIURIL) 25 MG tablet, Take 25 mg by mouth Daily., Disp: , Rfl:   •  metFORMIN (GLUCOPHAGE) 1000 MG tablet, Take 1,000 mg by mouth 2 (Two) Times a Day With Meals., Disp: , Rfl:   •  metoprolol tartrate (LOPRESSOR) 25 MG tablet, Take 1 tablet by mouth Every 12 (Twelve) Hours., Disp: 180 tablet, Rfl: 0  •  ramipril (ALTACE) 10 MG capsule, Take 1 capsule by mouth 2 (Two) Times a Day., Disp: 180 capsule, Rfl: 3  •  rosuvastatin (CRESTOR) 10 MG tablet, Take 1 tablet by mouth Daily., Disp: 30 tablet, Rfl: 3  •  Thyroid (ARMOUR THYROID) 60 MG PO tablet, Take 60 mg by mouth Daily., Disp: , Rfl:           ROS  Review of Systems   Cardiovascular: Positive for leg swelling. Negative for chest pain, claudication and dyspnea on exertion.   Respiratory: Positive for cough. Negative for shortness of breath.          SOCIAL HX  Social History     Socioeconomic History   • Marital status:      Spouse name: Not on file   • Number of children: Not on file   • Years of education: Not on file   • Highest education level: Not on file   Social Needs   • Financial resource strain: Not on file   • Food insecurity - worry: Not on file   • Food insecurity - inability: Not on file   • Transportation needs - medical: Not on file   • Transportation needs - non-medical: Not on file   Occupational History   • Not on file   Tobacco Use   • Smoking status: Never Smoker   • Smokeless tobacco: Never Used   Substance and Sexual Activity   • Alcohol use: No   • Drug use: No   • Sexual activity: Defer   Other Topics Concern   • Not on file   Social History Narrative   • Not on file       FAMILY HX  Family History   Problem Relation Age of Onset   • Cancer Mother    • Heart attack Father              Josef Hamilton III,  MD, FACC

## 2019-02-13 ENCOUNTER — HOSPITAL ENCOUNTER (OUTPATIENT)
Dept: CARDIOLOGY | Facility: HOSPITAL | Age: 84
Discharge: HOME | End: 2019-02-13
Attending: INTERNAL MEDICINE

## 2019-02-13 DIAGNOSIS — R07.2 PRECORDIAL PAIN: ICD-10-CM

## 2019-02-13 LAB
BH CV STRESS BP STAGE 1: NORMAL
BH CV STRESS BP STAGE 2: NORMAL
BH CV STRESS BP STAGE 4: NORMAL
BH CV STRESS COMMENTS STAGE 1: NORMAL
BH CV STRESS DOSE REGADENOSON STAGE 1: 0.4
BH CV STRESS DURATION MIN STAGE 1: 1
BH CV STRESS DURATION MIN STAGE 2: 1
BH CV STRESS DURATION MIN STAGE 3: 1
BH CV STRESS DURATION MIN STAGE 4: 1
BH CV STRESS DURATION SEC STAGE 1: 10
BH CV STRESS DURATION SEC STAGE 2: 0
BH CV STRESS GRADE STAGE 1: 10
BH CV STRESS HR STAGE 1: 103
BH CV STRESS HR STAGE 2: 96
BH CV STRESS HR STAGE 3: 90
BH CV STRESS HR STAGE 4: 85
BH CV STRESS METS STAGE 1: 5
BH CV STRESS PROTOCOL 1: NORMAL
BH CV STRESS RECOVERY BP: NORMAL MMHG
BH CV STRESS RECOVERY HR: 82 BPM
BH CV STRESS SPEED STAGE 1: 1.7
BH CV STRESS STAGE 1: 1
BH CV STRESS STAGE 2: 2
BH CV STRESS STAGE 3: 3
BH CV STRESS STAGE 4: 4
LV EF NUC BP: 81 %
MAXIMAL PREDICTED HEART RATE: 136 BPM
PERCENT MAX PREDICTED HR: 76.47 %
STRESS BASELINE BP: NORMAL MMHG
STRESS BASELINE HR: 76 BPM
STRESS PERCENT HR: 90 %
STRESS POST PEAK BP: NORMAL MMHG
STRESS POST PEAK HR: 104 BPM
STRESS TARGET HR: 116 BPM

## 2019-02-13 PROCEDURE — 93018 CV STRESS TEST I&R ONLY: CPT | Performed by: INTERNAL MEDICINE

## 2019-02-13 PROCEDURE — 0 TECHNETIUM SESTAMIBI: Performed by: INTERNAL MEDICINE

## 2019-02-13 PROCEDURE — A9500 TC99M SESTAMIBI: HCPCS | Performed by: INTERNAL MEDICINE

## 2019-02-13 PROCEDURE — 78452 HT MUSCLE IMAGE SPECT MULT: CPT

## 2019-02-13 PROCEDURE — 25010000002 REGADENOSON 0.4 MG/5ML SOLUTION: Performed by: INTERNAL MEDICINE

## 2019-02-13 PROCEDURE — 93017 CV STRESS TEST TRACING ONLY: CPT

## 2019-02-13 PROCEDURE — 78452 HT MUSCLE IMAGE SPECT MULT: CPT | Performed by: INTERNAL MEDICINE

## 2019-02-13 RX ADMIN — TECHNETIUM TC 99M SESTAMIBI 1 DOSE: 1 INJECTION INTRAVENOUS at 12:35

## 2019-02-13 RX ADMIN — REGADENOSON 0.4 MG: 0.08 INJECTION, SOLUTION INTRAVENOUS at 12:32

## 2019-02-13 RX ADMIN — TECHNETIUM TC 99M SESTAMIBI 1 DOSE: 1 INJECTION INTRAVENOUS at 10:50

## 2019-02-14 ENCOUNTER — TELEPHONE (OUTPATIENT)
Dept: CARDIOLOGY | Facility: CLINIC | Age: 84
End: 2019-02-14

## 2019-02-14 NOTE — TELEPHONE ENCOUNTER
----- Message from Josef Hamilton III, MD sent at 2/14/2019  9:17 AM EST -----  normal stress test

## 2019-07-22 ENCOUNTER — OFFICE VISIT (OUTPATIENT)
Dept: CARDIOLOGY | Facility: CLINIC | Age: 84
End: 2019-07-22

## 2019-07-22 VITALS
SYSTOLIC BLOOD PRESSURE: 130 MMHG | WEIGHT: 159.2 LBS | BODY MASS INDEX: 28.21 KG/M2 | HEIGHT: 63 IN | OXYGEN SATURATION: 94 % | DIASTOLIC BLOOD PRESSURE: 70 MMHG | HEART RATE: 69 BPM

## 2019-07-22 DIAGNOSIS — I10 BENIGN HYPERTENSION: ICD-10-CM

## 2019-07-22 DIAGNOSIS — E78.2 MIXED HYPERLIPIDEMIA: ICD-10-CM

## 2019-07-22 DIAGNOSIS — R00.2 PALPITATIONS: ICD-10-CM

## 2019-07-22 DIAGNOSIS — I25.10 CORONARY ARTERY DISEASE INVOLVING NATIVE CORONARY ARTERY OF NATIVE HEART WITHOUT ANGINA PECTORIS: Primary | ICD-10-CM

## 2019-07-22 PROCEDURE — 99213 OFFICE O/P EST LOW 20 MIN: CPT | Performed by: INTERNAL MEDICINE

## 2019-07-22 RX ORDER — GLIPIZIDE 5 MG/1
5 TABLET ORAL DAILY
COMMUNITY
End: 2020-08-03

## 2019-07-22 RX ORDER — CHLORTHALIDONE 25 MG/1
12.5 TABLET ORAL DAILY
COMMUNITY
End: 2021-08-16

## 2019-07-22 NOTE — PROGRESS NOTES
[place] [facility]  Office visit  Noé Patton  1935  [phone]    VISIT DATE:  7/22/2019      PCP: Fatimah Mensah MD  [address] [place] [address] [address] [place] [address]    CC:  Chief Complaint   Patient presents with   • Coronary Artery Disease   • Arm Pain     Left arm        PROBLEM LIST:  1. Coronary artery disease:  a. ACS status post catheterization, 01/29/2012: Two 3.5 x 28 mm Xience drug-eluting stents of the proximal RCA, EF 30%.  b. Echocardiogram, 03/12/2012: EF 50% to 55%. Mild MR and moderate TR, RVSP at 46, left atrium 3.8 cm.  c. Cessation of Plavix secondary to epistaxis.  d. Echocardiogram 02/09/2016: EF 55% to 60%, mild MR, mild TR, no AS.   e. Nuclear Cardiolite 02/11/2016: No evidence of inducible ischemia, normal LV function.   2. Benign hypertension.  3. Type 2 diabetes mellitus.  4. Hypothyroidism.  5. History of tobacco, quitting several years ago.  6. Family history.  7. Palpitations:   a. Holter monitor 01/21/2016, showing PACs, PVCs, but no arrhythmias or pauses.   8. Osteoarthritis.  9. Surgical history:  a. Benign left breast biopsy.  b. Right cataract      ASSESSMENT:   Diagnosis Plan   1. Coronary artery disease involving native coronary artery of native heart without angina pectoris     2. Benign hypertension     3. Palpitations     4. Mixed hyperlipidemia         PLAN:  Coronary artery disease: Continue aspirin, beta blockade and statin therapy.currently asymptomatic, status post recent reassuring myocardial perfusion imaging.    Hypertension:: Goal less than 130/80 mmHg.  Currently controlled.  Continue combination of Cardura, chlorthalidone, amlodipine, metoprolol and ace inhibitor.      Crestor: Goal LDL less than 100.  Currently controlled.  Continue Crestor 10 mg by mouth daily.    Subjective  Reports stable functional capacity.  Denies palpitations, or dyspnea on exertion.  Compliant with medical therapy.  Blood pressures running less than  "140/90 mmHg.    Developed flushing on higher dose of amlodipine.  Blood pressures been better controlled after switching hydrochlorothiazide to chlorthalidone.  Reports dull constant ache in her left shoulder and upper arm which worsens with certain movements.  Undergoing physical therapy.     PHYSICAL EXAMINATION:  Vitals:    07/22/19 0949   BP: 130/70   BP Location: Right arm   Patient Position: Sitting   Pulse: 69   SpO2: 94%   Weight: 72.2 kg (159 lb 3.2 oz)   Height: 160 cm (63\")     General Appearance:    Alert, cooperative, no distress, appears stated age   Head:    Normocephalic, without obvious abnormality, atraumatic   Eyes:    conjunctiva/corneas clear   Nose:   Nares normal, septum midline, mucosa normal, no drainage   Throat:   Lips, teeth and gums normal   Neck:   Supple, symmetrical, trachea midline, no carotid    bruit or JVD   Lungs:     Clear to auscultation bilaterally, respirations unlabored   Chest Wall:    No tenderness or deformity    Heart:    Regular rate and rhythm, S1 and S2 normal, 2/6 early peaking systolic murmur right upper sternal border, rub   or gallop, normal carotid impulse bilaterally without bruit.   Abdomen:     Soft, non-tender   Extremities:   Extremities normal, atraumatic, no cyanosis or edema   Pulses:   2+ and symmetric all extremities   Skin:   Skin color, texture, turgor normal, no rashes or lesions, Spider veins noted on bilateral lower extremities.         Diagnostic Data:  Procedures  No results found for: CHLPL, TRIG, HDL, LDLDIRECT  No results found for: GLUCOSE, BUN, CREATININE, NA, K, CL, CO2, CREATININE, BUN  No results found for: HGBA1C  No results found for: WBC, HGB, HCT, PLT    Allergies  No Known Allergies    Current Medications    Current Outpatient Medications:   •  amLODIPine (NORVASC) 5 MG tablet, Take 1 tablet by mouth Daily., Disp: 30 tablet, Rfl: 11  •  aspirin 81 MG tablet, Take 81 mg by mouth Daily., Disp: , Rfl:   •  chlorthalidone (HYGROTON) 25 " MG tablet, Take 25 mg by mouth Daily., Disp: , Rfl:   •  glipiZIDE (GLUCOTROL) 5 MG tablet, Take 5 mg by mouth Daily., Disp: , Rfl:   •  metFORMIN (GLUCOPHAGE) 1000 MG tablet, Take 1,000 mg by mouth 2 (Two) Times a Day With Meals., Disp: , Rfl:   •  metoprolol tartrate (LOPRESSOR) 25 MG tablet, Take 1 tablet by mouth Every 12 (Twelve) Hours., Disp: 180 tablet, Rfl: 0  •  ramipril (ALTACE) 10 MG capsule, Take 1 capsule by mouth 2 (Two) Times a Day., Disp: 180 capsule, Rfl: 3  •  rosuvastatin (CRESTOR) 10 MG tablet, Take 1 tablet by mouth Daily., Disp: 30 tablet, Rfl: 3  •  Thyroid (ARMOUR THYROID) 60 MG PO tablet, Take 60 mg by mouth Daily., Disp: , Rfl:           ROS  Review of Systems   Cardiovascular: Positive for leg swelling. Negative for chest pain, claudication and dyspnea on exertion.   Respiratory: Negative for cough and shortness of breath.          SOCIAL HX  Social History     Socioeconomic History   • Marital status:      Spouse name: Not on file   • Number of children: Not on file   • Years of education: Not on file   • Highest education level: Not on file   Tobacco Use   • Smoking status: Never Smoker   • Smokeless tobacco: Never Used   Substance and Sexual Activity   • Alcohol use: No   • Drug use: No   • Sexual activity: Defer       FAMILY HX  Family History   Problem Relation Age of Onset   • Cancer Mother    • Heart attack Father              Josef Hamilton III, MD, [facility]

## 2020-01-27 ENCOUNTER — OFFICE VISIT (OUTPATIENT)
Dept: CARDIOLOGY | Facility: CLINIC | Age: 85
End: 2020-01-27

## 2020-01-27 VITALS
HEART RATE: 91 BPM | WEIGHT: 163.2 LBS | OXYGEN SATURATION: 92 % | BODY MASS INDEX: 28.92 KG/M2 | HEIGHT: 63 IN | SYSTOLIC BLOOD PRESSURE: 138 MMHG | DIASTOLIC BLOOD PRESSURE: 68 MMHG

## 2020-01-27 DIAGNOSIS — R00.2 PALPITATIONS: ICD-10-CM

## 2020-01-27 DIAGNOSIS — I25.10 CORONARY ARTERY DISEASE INVOLVING NATIVE CORONARY ARTERY OF NATIVE HEART WITHOUT ANGINA PECTORIS: Primary | ICD-10-CM

## 2020-01-27 DIAGNOSIS — E78.2 MIXED HYPERLIPIDEMIA: ICD-10-CM

## 2020-01-27 DIAGNOSIS — I10 BENIGN HYPERTENSION: ICD-10-CM

## 2020-01-27 PROCEDURE — 99214 OFFICE O/P EST MOD 30 MIN: CPT | Performed by: INTERNAL MEDICINE

## 2020-01-27 RX ORDER — ISOSORBIDE MONONITRATE 30 MG/1
30 TABLET, EXTENDED RELEASE ORAL DAILY
Qty: 30 TABLET | Refills: 11 | Status: SHIPPED | OUTPATIENT
Start: 2020-01-27 | End: 2020-08-03

## 2020-01-27 NOTE — PROGRESS NOTES
[place] [facility]  Office visit  Noé Patton  1935  [phone]    VISIT DATE:  1/27/2020      PCP: Fatimah Mensah MD  [address] [place] [address] [address] [place] [address]    CC:  Chief Complaint   Patient presents with   • Coronary Artery Disease       PROBLEM LIST:  1. Coronary artery disease:  a. ACS status post catheterization, 01/29/2012: Two 3.5 x 28 mm Xience drug-eluting stents of the proximal RCA, EF 30%.  b. Echocardiogram, 03/12/2012: EF 50% to 55%. Mild MR and moderate TR, RVSP at 46, left atrium 3.8 cm.  c. Cessation of Plavix secondary to epistaxis.  d. Echocardiogram 02/09/2016: EF 55% to 60%, mild MR, mild TR, no AS.   e. Nuclear Cardiolite 02/11/2016: No evidence of inducible ischemia, normal LV function.   2. Benign hypertension.  3. Type 2 diabetes mellitus.  4. Hypothyroidism.  5. History of tobacco, quitting several years ago.  6. Family history.  7. Palpitations:   a. Holter monitor 01/21/2016, showing PACs, PVCs, but no arrhythmias or pauses.   8. Osteoarthritis.  9. Surgical history:  a. Benign left breast biopsy.  b. Right cataract      ASSESSMENT:   Diagnosis Plan   1. Coronary artery disease involving native coronary artery of native heart without angina pectoris     2. Benign hypertension     3. Palpitations     4. Mixed hyperlipidemia         PLAN:  Coronary artery disease: Continue aspirin, beta blockade and statin therapy.persistent mild atypical angina class II pattern, status post recent reassuring myocardial perfusion imaging.  Adding Imdur 30 mg p.o. daily.    Hypertension:: Goal less than 130/80 mmHg.  Currently controlled.  Continue combination of Cardura, chlorthalidone, amlodipine, metoprolol and ace inhibitor.      Crestor: Goal LDL less than 100.  Currently controlled.  Continue Crestor 10 mg by mouth daily.    Subjective  Reports stable functional capacity.  Denies palpitations, or dyspnea on exertion.  Compliant with medical therapy.  Blood  "pressures running less than 140/90 mmHg.    Developed flushing on higher dose of amlodipine.  Blood pressures been better controlled after switching hydrochlorothiazide to chlorthalidone and muscle cramps resolved.  Reports dull constant ache in her left shoulder and upper arm which worsens with certain movements or in the morning when she first wakes up.  Undergoing physical therapy.  Still has intermittent pulling sensation in her left chest when she feels like she is rushing like walking fast across a parking lot, resolves with rest.     PHYSICAL EXAMINATION:  Vitals:    01/27/20 1115   BP: 138/68   BP Location: Right arm   Patient Position: Sitting   Pulse: 91   SpO2: 92%   Weight: 74 kg (163 lb 3.2 oz)   Height: 160 cm (63\")     General Appearance:    Alert, cooperative, no distress, appears stated age   Head:    Normocephalic, without obvious abnormality, atraumatic   Eyes:    conjunctiva/corneas clear   Nose:   Nares normal, septum midline, mucosa normal, no drainage   Throat:   Lips, teeth and gums normal   Neck:   Supple, symmetrical, trachea midline, no carotid    bruit or JVD   Lungs:     Clear to auscultation bilaterally, respirations unlabored   Chest Wall:    No tenderness or deformity    Heart:    Regular rate and rhythm, S1 and S2 normal, 2/6 early peaking systolic murmur right upper sternal border, rub   or gallop, normal carotid impulse bilaterally without bruit.   Abdomen:     Soft, non-tender   Extremities:   Extremities normal, atraumatic, no cyanosis or edema   Pulses:   2+ and symmetric all extremities   Skin:   Skin color, texture, turgor normal, no rashes or lesions, Spider veins noted on bilateral lower extremities.         Diagnostic Data:  Procedures  No results found for: CHLPL, TRIG, HDL, LDLDIRECT  No results found for: GLUCOSE, BUN, CREATININE, NA, K, CL, CO2, CREATININE, BUN  No results found for: HGBA1C  No results found for: WBC, HGB, HCT, PLT    Allergies  No Known " Allergies    Current Medications    Current Outpatient Medications:   •  amLODIPine (NORVASC) 5 MG tablet, Take 1 tablet by mouth Daily., Disp: 30 tablet, Rfl: 11  •  aspirin 81 MG tablet, Take 81 mg by mouth Daily., Disp: , Rfl:   •  chlorthalidone (HYGROTON) 25 MG tablet, Take 25 mg by mouth Daily., Disp: , Rfl:   •  glipiZIDE (GLUCOTROL) 5 MG tablet, Take 5 mg by mouth Daily., Disp: , Rfl:   •  metFORMIN (GLUCOPHAGE) 1000 MG tablet, Take 1,000 mg by mouth 2 (Two) Times a Day With Meals., Disp: , Rfl:   •  metoprolol tartrate (LOPRESSOR) 25 MG tablet, Take 1 tablet by mouth Every 12 (Twelve) Hours., Disp: 180 tablet, Rfl: 0  •  ramipril (ALTACE) 10 MG capsule, Take 1 capsule by mouth 2 (Two) Times a Day., Disp: 180 capsule, Rfl: 3  •  rosuvastatin (CRESTOR) 10 MG tablet, Take 1 tablet by mouth Daily., Disp: 30 tablet, Rfl: 3  •  Thyroid (ARMOUR THYROID) 60 MG PO tablet, Take 60 mg by mouth Daily., Disp: , Rfl:           ROS  Review of Systems   Cardiovascular: Positive for leg swelling. Negative for chest pain, claudication and dyspnea on exertion.   Respiratory: Negative for cough and shortness of breath.          SOCIAL HX  Social History     Socioeconomic History   • Marital status:      Spouse name: Not on file   • Number of children: Not on file   • Years of education: Not on file   • Highest education level: Not on file   Tobacco Use   • Smoking status: Never Smoker   • Smokeless tobacco: Never Used   Substance and Sexual Activity   • Alcohol use: No   • Drug use: No   • Sexual activity: Defer       FAMILY HX  Family History   Problem Relation Age of Onset   • Cancer Mother    • Heart attack Father              Josef Hamilton III, MD, [facility]

## 2020-08-03 ENCOUNTER — OFFICE VISIT (OUTPATIENT)
Dept: CARDIOLOGY | Facility: CLINIC | Age: 85
End: 2020-08-03

## 2020-08-03 VITALS
BODY MASS INDEX: 27.36 KG/M2 | OXYGEN SATURATION: 97 % | WEIGHT: 154.4 LBS | HEIGHT: 63 IN | HEART RATE: 80 BPM | SYSTOLIC BLOOD PRESSURE: 178 MMHG | TEMPERATURE: 96.5 F | DIASTOLIC BLOOD PRESSURE: 88 MMHG

## 2020-08-03 DIAGNOSIS — I25.10 CORONARY ARTERY DISEASE INVOLVING NATIVE CORONARY ARTERY OF NATIVE HEART WITHOUT ANGINA PECTORIS: Primary | ICD-10-CM

## 2020-08-03 DIAGNOSIS — I10 BENIGN HYPERTENSION: ICD-10-CM

## 2020-08-03 DIAGNOSIS — E78.2 MIXED HYPERLIPIDEMIA: ICD-10-CM

## 2020-08-03 DIAGNOSIS — R00.2 PALPITATIONS: ICD-10-CM

## 2020-08-03 PROCEDURE — 99214 OFFICE O/P EST MOD 30 MIN: CPT | Performed by: INTERNAL MEDICINE

## 2020-08-03 RX ORDER — LOSARTAN POTASSIUM 25 MG/1
25 TABLET ORAL 2 TIMES DAILY
Qty: 60 TABLET | Refills: 5 | Status: SHIPPED | OUTPATIENT
Start: 2020-08-03

## 2020-08-03 NOTE — PROGRESS NOTES
[place] [facility]  Office visit  Noé Patton  1935  [phone]    VISIT DATE:  8/3/2020      PCP: Fatimah Mensah MD  [address] [place] [address] [address] [place] [address]    CC:  Chief Complaint   Patient presents with   • Coronary Artery Disease     f/u       PROBLEM LIST:  1. Coronary artery disease:  a. ACS status post catheterization, 01/29/2012: Two 3.5 x 28 mm Xience drug-eluting stents of the proximal RCA, EF 30%.  b. Echocardiogram, 03/12/2012: EF 50% to 55%. Mild MR and moderate TR, RVSP at 46, left atrium 3.8 cm.  c. Cessation of Plavix secondary to epistaxis.  d. Echocardiogram 02/09/2016: EF 55% to 60%, mild MR, mild TR, no AS.   e. Nuclear Cardiolite 02/11/2016: No evidence of inducible ischemia, normal LV function.   2. Benign hypertension.  3. Type 2 diabetes mellitus.  4. Hypothyroidism.  5. History of tobacco, quitting several years ago.  6. Family history.  7. Palpitations:   a. Holter monitor 01/21/2016, showing PACs, PVCs, but no arrhythmias or pauses.   8. Osteoarthritis.  9. Surgical history:  a. Benign left breast biopsy.  b. Right cataract      ASSESSMENT:   Diagnosis Plan   1. Coronary artery disease involving native coronary artery of native heart without angina pectoris     2. Benign hypertension     3. Palpitations     4. Mixed hyperlipidemia         PLAN:  Coronary artery disease: Continue aspirin, beta blockade and statin therapy.  Currently asymptomatic without angina.  We will try Imdur if chest discomfort  Returns.    Hypertension:: Goal less than 130/80 mmHg.  Currently controlled.  Well-documented whitecoat hypertension.  Switching ramipril to angiotensin receptor blockade due to cough.  Otherwise continue combination of Cardura, chlorthalidone, amlodipine, and metoprolol.      Crestor: Goal LDL less than 100.  Currently controlled.  Continue Crestor 10 mg by mouth daily.    Subjective  Reports stable functional capacity.  Denies palpitations, or  "dyspnea on exertion.  Compliant with medical therapy.  Blood pressures running less than 140/90 mmHg.    Developed flushing on higher dose of amlodipine.  She reports developing a cough due to dry tickle sensation in the back of her throat after each ramipril dose.  Blood pressures been better controlled after switching hydrochlorothiazide to chlorthalidone with an improvement in frequency of lower extremity muscle cramps.  She has not had any inducible angina.  She was afraid to start taking her Imdur due to potential side effect of headache.    PHYSICAL EXAMINATION:  Vitals:    08/03/20 1104   BP: 178/88   BP Location: Right arm   Patient Position: Sitting   Pulse: 80   Temp: 96.5 °F (35.8 °C)   SpO2: 97%   Weight: 70 kg (154 lb 6.4 oz)   Height: 160 cm (63\")     General Appearance:    Alert, cooperative, no distress, appears stated age   Head:    Normocephalic, without obvious abnormality, atraumatic   Eyes:    conjunctiva/corneas clear   Nose:   Nares normal, septum midline, mucosa normal, no drainage   Throat:   Lips, teeth and gums normal   Neck:   Supple, symmetrical, trachea midline, no carotid    bruit or JVD   Lungs:     Clear to auscultation bilaterally, respirations unlabored   Chest Wall:    No tenderness or deformity    Heart:    Regular rate and rhythm, S1 and S2 normal, 2/6 early peaking systolic murmur right upper sternal border, rub   or gallop, normal carotid impulse bilaterally without bruit.   Abdomen:     Soft, non-tender   Extremities:   Extremities normal, atraumatic, no cyanosis or edema   Pulses:   2+ and symmetric all extremities   Skin:   Skin color, texture, turgor normal, no rashes or lesions, Spider veins noted on bilateral lower extremities.         Diagnostic Data:  Procedures  No results found for: CHLPL, TRIG, HDL, LDLDIRECT  No results found for: GLUCOSE, BUN, CREATININE, NA, K, CL, CO2, CREATININE, BUN  No results found for: HGBA1C  No results found for: WBC, HGB, HCT, " PLT    Allergies  No Known Allergies    Current Medications    Current Outpatient Medications:   •  aspirin 81 MG tablet, Take 81 mg by mouth Daily., Disp: , Rfl:   •  chlorthalidone (HYGROTON) 25 MG tablet, Take 25 mg by mouth Daily., Disp: , Rfl:   •  metFORMIN (GLUCOPHAGE) 1000 MG tablet, Take 1,000 mg by mouth 2 (Two) Times a Day With Meals., Disp: , Rfl:   •  metoprolol tartrate (LOPRESSOR) 25 MG tablet, Take 1 tablet by mouth Every 12 (Twelve) Hours., Disp: 180 tablet, Rfl: 0  •  ramipril (ALTACE) 10 MG capsule, Take 1 capsule by mouth 2 (Two) Times a Day., Disp: 180 capsule, Rfl: 3  •  rosuvastatin (CRESTOR) 10 MG tablet, Take 1 tablet by mouth Daily., Disp: 30 tablet, Rfl: 3  •  SITagliptin (JANUVIA) 100 MG tablet, Take 100 mg by mouth Daily., Disp: , Rfl:   •  Thyroid (ARMOUR THYROID) 60 MG PO tablet, Take 60 mg by mouth Daily., Disp: , Rfl:           ROS  Review of Systems   Cardiovascular: Positive for leg swelling. Negative for chest pain, claudication and dyspnea on exertion.   Respiratory: Negative for cough and shortness of breath.          SOCIAL HX  Social History     Socioeconomic History   • Marital status:      Spouse name: Not on file   • Number of children: Not on file   • Years of education: Not on file   • Highest education level: Not on file   Tobacco Use   • Smoking status: Never Smoker   • Smokeless tobacco: Never Used   Substance and Sexual Activity   • Alcohol use: No   • Drug use: No   • Sexual activity: Defer       FAMILY HX  Family History   Problem Relation Age of Onset   • Cancer Mother    • Heart attack Father              Josef Hamilton III, MD, FACC

## 2021-02-08 ENCOUNTER — OFFICE VISIT (OUTPATIENT)
Dept: CARDIOLOGY | Facility: CLINIC | Age: 86
End: 2021-02-08

## 2021-02-08 VITALS
SYSTOLIC BLOOD PRESSURE: 160 MMHG | OXYGEN SATURATION: 96 % | DIASTOLIC BLOOD PRESSURE: 74 MMHG | BODY MASS INDEX: 28.17 KG/M2 | HEART RATE: 74 BPM | HEIGHT: 63 IN | WEIGHT: 159 LBS

## 2021-02-08 DIAGNOSIS — I25.10 CORONARY ARTERY DISEASE INVOLVING NATIVE CORONARY ARTERY OF NATIVE HEART WITHOUT ANGINA PECTORIS: Primary | ICD-10-CM

## 2021-02-08 DIAGNOSIS — E78.2 MIXED HYPERLIPIDEMIA: ICD-10-CM

## 2021-02-08 DIAGNOSIS — I10 BENIGN HYPERTENSION: ICD-10-CM

## 2021-02-08 PROCEDURE — 99214 OFFICE O/P EST MOD 30 MIN: CPT | Performed by: INTERNAL MEDICINE

## 2021-02-08 RX ORDER — AMLODIPINE BESYLATE 2.5 MG/1
2.5 TABLET ORAL
Qty: 90 TABLET | Refills: 3 | Status: SHIPPED | OUTPATIENT
Start: 2021-02-08 | End: 2021-08-16

## 2021-02-08 NOTE — PROGRESS NOTES
[facility] [place], [place]  Office visit  Noé Patton  1935  [phone]    VISIT DATE:  2/8/2021      PCP: Fatimah Mensah MD  [address] [place] [address] [address] [place] [address]    CC:  Chief Complaint   Patient presents with   • Coronary Artery Disease     f/u       PROBLEM LIST:  1. Coronary artery disease:  a. ACS status post catheterization, 01/29/2012: Two 3.5 x 28 mm Xience drug-eluting stents of the proximal RCA, EF 30%.  b. Echocardiogram, 03/12/2012: EF 50% to 55%. Mild MR and moderate TR, RVSP at 46, left atrium 3.8 cm.  c. Cessation of Plavix secondary to epistaxis.  d. Echocardiogram 02/09/2016: EF 55% to 60%, mild MR, mild TR, no AS.   e. Nuclear Cardiolite 02/11/2016: No evidence of inducible ischemia, normal LV function.   2. Benign hypertension.  3. Type 2 diabetes mellitus.  4. Hypothyroidism.  5. History of tobacco, quitting several years ago.  6. Family history.  7. Palpitations:   a. Holter monitor 01/21/2016, showing PACs, PVCs, but no arrhythmias or pauses.   8. Osteoarthritis.  9. Surgical history:  a. Benign left breast biopsy.  b. Right cataract      ASSESSMENT:   Diagnosis Plan   1. Coronary artery disease involving native coronary artery of native heart without angina pectoris     2. Benign hypertension     3. Mixed hyperlipidemia         PLAN:  Coronary artery disease: Intermittent mild angina.  Restarting amlodipine 2.5 mg p.o. at bedtime.  Continue aspirin, beta blockade and statin therapy.  We will add Imdur if chest discomfort persist.    Hypertension: Goal less than 130/80 mmHg.  Currently controlled.  Well-documented whitecoat hypertension.  Decreasing chlorthalidone to 12.5 mg p.o. daily and adding amlodipine 2.5 mg p.o. nightly, otherwise continue current medical therapy.    Hyperlipidemia: Goal LDL less than 70.  Currently controlled.  Continue Crestor 10 mg by mouth daily.    Subjective  She has noticed some mild increase in shortness of breath in a class  "II pattern.  She is still able to do household chores without limitations.  She also notices episodes of left precordial chest tightness with more strenuous activity such as walking prolonged periods of time, symptoms resolved with rest.  These are similar symptoms that she experienced prior to her previous reassuring perfusion imaging.  Compliant with medical therapy.  Blood pressures running less than 140/90 mmHg.    Developed flushing on higher dose of amlodipine.  She reports developing a cough due to dry tickle sensation in the back of her throat after each ramipril dose.  Blood pressures been better controlled after switching hydrochlorothiazide to chlorthalidone with an improvement in frequency of lower extremity muscle cramps.  She is still complaining of intermittent left calf cramping.  Previously she was afraid to start taking her Imdur due to potential side effect of headache.    PHYSICAL EXAMINATION:  Vitals:    02/08/21 1146   BP: 160/74   BP Location: Right arm   Patient Position: Sitting   Pulse: 74   SpO2: 96%   Weight: 72.1 kg (159 lb)   Height: 160 cm (63\")     General Appearance:    Alert, cooperative, no distress, appears stated age   Head:    Normocephalic, without obvious abnormality, atraumatic   Eyes:    conjunctiva/corneas clear   Nose:   Nares normal, septum midline, mucosa normal, no drainage   Throat:   Lips, teeth and gums normal   Neck:   Supple, symmetrical, trachea midline, no carotid    bruit or JVD   Lungs:     Clear to auscultation bilaterally, respirations unlabored   Chest Wall:    No tenderness or deformity    Heart:    Regular rate and rhythm, S1 and S2 normal, 2/6 early peaking systolic murmur right upper sternal border, rub   or gallop, normal carotid impulse bilaterally without bruit.   Abdomen:     Soft, non-tender   Extremities:   Extremities normal, atraumatic, no cyanosis or edema   Pulses:   2+ and symmetric all extremities   Skin:   Skin color, texture, turgor normal, " no rashes or lesions, Spider veins noted on bilateral lower extremities.         Diagnostic Data:  Procedures  No results found for: CHLPL, TRIG, HDL, LDLDIRECT  No results found for: GLUCOSE, BUN, CREATININE, NA, K, CL, CO2, CREATININE, BUN  No results found for: HGBA1C  No results found for: WBC, HGB, HCT, PLT    Allergies  Allergies   Allergen Reactions   • Ramipril Cough       Current Medications    Current Outpatient Medications:   •  aspirin 81 MG tablet, Take 81 mg by mouth Daily., Disp: , Rfl:   •  chlorthalidone (HYGROTON) 25 MG tablet, Take 12.5 mg by mouth Daily., Disp: , Rfl:   •  losartan (Cozaar) 25 MG tablet, Take 1 tablet by mouth 2 (two) times a day., Disp: 60 tablet, Rfl: 5  •  metFORMIN (GLUCOPHAGE) 1000 MG tablet, Take 1,000 mg by mouth 2 (Two) Times a Day With Meals., Disp: , Rfl:   •  metoprolol tartrate (LOPRESSOR) 25 MG tablet, Take 1 tablet by mouth Every 12 (Twelve) Hours., Disp: 180 tablet, Rfl: 0  •  rosuvastatin (CRESTOR) 10 MG tablet, Take 1 tablet by mouth Daily., Disp: 30 tablet, Rfl: 3  •  SITagliptin (JANUVIA) 100 MG tablet, Take 100 mg by mouth Daily., Disp: , Rfl:   •  Thyroid (ARMOUR THYROID) 60 MG PO tablet, Take 60 mg by mouth Daily., Disp: , Rfl:   •  amLODIPine (NORVASC) 2.5 MG tablet, Take 1 tablet by mouth every night at bedtime., Disp: 90 tablet, Rfl: 3          ROS  Review of Systems   Cardiovascular: Positive for leg swelling. Negative for chest pain, claudication and dyspnea on exertion.   Respiratory: Negative for cough and shortness of breath.          SOCIAL HX  Social History     Socioeconomic History   • Marital status:      Spouse name: Not on file   • Number of children: Not on file   • Years of education: Not on file   • Highest education level: Not on file   Tobacco Use   • Smoking status: Never Smoker   • Smokeless tobacco: Never Used   Substance and Sexual Activity   • Alcohol use: No   • Drug use: No   • Sexual activity: Defer       FAMILY HX  Family  History   Problem Relation Age of Onset   • Cancer Mother    • Heart attack Father              Josef Hamilton III, MD, FACC

## 2021-08-16 ENCOUNTER — OFFICE VISIT (OUTPATIENT)
Dept: CARDIOLOGY | Facility: CLINIC | Age: 86
End: 2021-08-16

## 2021-08-16 VITALS
BODY MASS INDEX: 23.64 KG/M2 | HEIGHT: 63 IN | SYSTOLIC BLOOD PRESSURE: 130 MMHG | HEART RATE: 89 BPM | OXYGEN SATURATION: 97 % | WEIGHT: 133.4 LBS | DIASTOLIC BLOOD PRESSURE: 82 MMHG

## 2021-08-16 DIAGNOSIS — E78.2 MIXED HYPERLIPIDEMIA: ICD-10-CM

## 2021-08-16 DIAGNOSIS — I10 BENIGN HYPERTENSION: ICD-10-CM

## 2021-08-16 DIAGNOSIS — I25.10 CORONARY ARTERY DISEASE INVOLVING NATIVE CORONARY ARTERY OF NATIVE HEART WITHOUT ANGINA PECTORIS: Primary | ICD-10-CM

## 2021-08-16 DIAGNOSIS — R00.2 PALPITATIONS: ICD-10-CM

## 2021-08-16 PROCEDURE — 99214 OFFICE O/P EST MOD 30 MIN: CPT | Performed by: INTERNAL MEDICINE

## 2021-08-16 RX ORDER — BLOOD SUGAR DIAGNOSTIC
1 STRIP MISCELLANEOUS DAILY
COMMUNITY
Start: 2021-08-09

## 2021-08-16 RX ORDER — FUROSEMIDE 20 MG/1
20 TABLET ORAL AS NEEDED
COMMUNITY
End: 2023-02-15

## 2021-08-16 RX ORDER — ISOSORBIDE MONONITRATE 30 MG/1
30 TABLET, EXTENDED RELEASE ORAL DAILY
Qty: 30 TABLET | Refills: 11 | Status: SHIPPED | OUTPATIENT
Start: 2021-08-16 | End: 2022-10-26 | Stop reason: SDUPTHER

## 2021-08-16 RX ORDER — CLOBETASOL PROPIONATE 0.5 MG/G
CREAM TOPICAL AS NEEDED
COMMUNITY
Start: 2021-05-20

## 2021-08-16 NOTE — PROGRESS NOTES
[facility]  Office visit  Noé Patton  1935  [phone]    VISIT DATE:  8/16/2021      PCP: Fatimah Mensah MD  [address] [place] [address] [address] [place] [address]    CC:  Chief Complaint   Patient presents with   • Coronary Artery Disease       PROBLEM LIST:  1. Coronary artery disease:  a. ACS status post catheterization, 01/29/2012: Two 3.5 x 28 mm Xience drug-eluting stents of the proximal RCA, EF 30%.  b. Echocardiogram, 03/12/2012: EF 50% to 55%. Mild MR and moderate TR, RVSP at 46, left atrium 3.8 cm.  c. Cessation of Plavix secondary to epistaxis.  d. Echocardiogram 02/09/2016: EF 55% to 60%, mild MR, mild TR, no AS.   e. Nuclear Cardiolite 02/11/2016: No evidence of inducible ischemia, normal LV function.   2. Benign hypertension.  3. Type 2 diabetes mellitus.  4. Hypothyroidism.  5. History of tobacco, quitting several years ago.  6. Family history.  7. Palpitations:   a. Holter monitor 01/21/2016, showing PACs, PVCs, but no arrhythmias or pauses.   8. Osteoarthritis.  9. Surgical history:  a. Benign left breast biopsy.  b. Right cataract      ASSESSMENT:   Diagnosis Plan   1. Coronary artery disease involving native coronary artery of native heart without angina pectoris     2. Benign hypertension     3. Mixed hyperlipidemia     4. Palpitations         PLAN:  Coronary artery disease: Stable angina, class II.  Intolerant to calcium channel blockade due to lower extreme edema.  Trial of Imdur 30 mg p.o. daily.  Continue aspirin, beta blockade and statin therapy.  We will proceed with cardiac catheterization if symptoms accelerate.    Hypertension: Goal less than 130/80 mmHg.  Currently controlled.  Well-documented whitecoat hypertension.  Continue current medical therapy.    Hyperlipidemia: Goal LDL less than 70.  Currently controlled.  Continue Crestor 10 mg by mouth daily.    Right upper extremity paresthesias: Appears consistent with carpal tunnel, instructed  "her to follow-up with primary care physician if symptoms progress.    Subjective  Reports left precordial chest heaviness which is mild if she walks too fast during such activities as walking around the department store.  Otherwise is chest pain-free as long as she paces herself.  No rest symptoms.  Developed worsening bilateral lower extremity edema on amlodipine.  Blood pressures running less than 130/80 mmHg.  Taking Lasix on an as-needed basis for bilateral lower extremity edema.  Has developed tingling sensation in her right arm which is most prominent when she wakes up in the morning, also experiences it if she is using the keyboard for prolonged period of time.    PHYSICAL EXAMINATION:  Vitals:    08/16/21 1016   Weight: 60.5 kg (133 lb 6.4 oz)   Height: 160 cm (63\")     General Appearance:    Alert, cooperative, no distress, appears stated age   Head:    Normocephalic, without obvious abnormality, atraumatic   Eyes:    conjunctiva/corneas clear   Nose:   Nares normal, septum midline, mucosa normal, no drainage   Throat:   Lips, teeth and gums normal   Neck:   Supple, symmetrical, trachea midline, no carotid    bruit or JVD   Lungs:     Clear to auscultation bilaterally, respirations unlabored   Chest Wall:    No tenderness or deformity    Heart:    Regular rate and rhythm, S1 and S2 normal, 2/6 early peaking systolic murmur right upper sternal border, rub   or gallop, normal carotid impulse bilaterally without bruit.   Abdomen:     Soft, non-tender   Extremities:   Extremities normal, atraumatic, no cyanosis or edema   Pulses:   2+ and symmetric all extremities   Skin:   Skin color, texture, turgor normal, no rashes or lesions, Spider veins noted on bilateral lower extremities.         Diagnostic Data:  Procedures  No results found for: CHLPL, TRIG, HDL, LDLDIRECT  No results found for: GLUCOSE, BUN, CREATININE, NA, K, CL, CO2, CREATININE, BUN  No results found for: HGBA1C  No results found for: WBC, HGB, " HCT, PLT    Allergies  Allergies   Allergen Reactions   • Ramipril Cough       Current Medications    Current Outpatient Medications:   •  Accu-Chek Zara Plus test strip, 1 each by Other route Daily. use to test once daily, Disp: , Rfl:   •  aspirin 81 MG tablet, Take 81 mg by mouth Daily., Disp: , Rfl:   •  clobetasol (TEMOVATE) 0.05 % cream, As Needed., Disp: , Rfl:   •  empagliflozin (Jardiance) 25 MG tablet tablet, Take 25 mg by mouth Daily., Disp: , Rfl:   •  furosemide (LASIX) 20 MG tablet, Take 20 mg by mouth As Needed., Disp: , Rfl:   •  losartan (Cozaar) 25 MG tablet, Take 1 tablet by mouth 2 (two) times a day., Disp: 60 tablet, Rfl: 5  •  metFORMIN (GLUCOPHAGE) 1000 MG tablet, Take 1,000 mg by mouth 2 (Two) Times a Day With Meals., Disp: , Rfl:   •  metoprolol tartrate (LOPRESSOR) 25 MG tablet, Take 1 tablet by mouth Every 12 (Twelve) Hours., Disp: 180 tablet, Rfl: 0  •  rosuvastatin (CRESTOR) 10 MG tablet, Take 1 tablet by mouth Daily., Disp: 30 tablet, Rfl: 3  •  Thyroid (ARMOUR THYROID) 60 MG PO tablet, Take 60 mg by mouth Daily., Disp: , Rfl:   •  amLODIPine (NORVASC) 2.5 MG tablet, Take 1 tablet by mouth every night at bedtime., Disp: 90 tablet, Rfl: 3  •  chlorthalidone (HYGROTON) 25 MG tablet, Take 12.5 mg by mouth Daily., Disp: , Rfl:   •  SITagliptin (JANUVIA) 100 MG tablet, Take 100 mg by mouth Daily., Disp: , Rfl:           ROS  Review of Systems   Cardiovascular: Positive for leg swelling. Negative for chest pain, claudication and dyspnea on exertion.   Respiratory: Negative for cough and shortness of breath.          SOCIAL HX  Social History     Socioeconomic History   • Marital status:      Spouse name: Not on file   • Number of children: Not on file   • Years of education: Not on file   • Highest education level: Not on file   Tobacco Use   • Smoking status: Never Smoker   • Smokeless tobacco: Never Used   Substance and Sexual Activity   • Alcohol use: No   • Drug use: No   • Sexual  activity: Defer       FAMILY HX  Family History   Problem Relation Age of Onset   • Cancer Mother    • Heart attack Father              Josef Hamilton III, MD, FACC

## 2022-08-02 ENCOUNTER — APPOINTMENT (OUTPATIENT)
Dept: CT IMAGING | Facility: HOSPITAL | Age: 87
End: 2022-08-02

## 2022-08-02 ENCOUNTER — APPOINTMENT (OUTPATIENT)
Dept: GENERAL RADIOLOGY | Facility: HOSPITAL | Age: 87
End: 2022-08-02

## 2022-08-02 ENCOUNTER — HOSPITAL ENCOUNTER (EMERGENCY)
Facility: HOSPITAL | Age: 87
Discharge: HOME | End: 2022-08-02
Attending: EMERGENCY MEDICINE | Admitting: EMERGENCY MEDICINE

## 2022-08-02 VITALS
HEART RATE: 81 BPM | SYSTOLIC BLOOD PRESSURE: 155 MMHG | OXYGEN SATURATION: 97 % | BODY MASS INDEX: 25.69 KG/M2 | DIASTOLIC BLOOD PRESSURE: 94 MMHG | TEMPERATURE: 98.1 F | HEIGHT: 63 IN | WEIGHT: 145 LBS | RESPIRATION RATE: 18 BRPM

## 2022-08-02 DIAGNOSIS — S32.810A CLOSED PELVIC RING FRACTURE, INITIAL ENCOUNTER: Primary | ICD-10-CM

## 2022-08-02 PROCEDURE — 99283 EMERGENCY DEPT VISIT LOW MDM: CPT

## 2022-08-02 PROCEDURE — 73502 X-RAY EXAM HIP UNI 2-3 VIEWS: CPT

## 2022-08-02 PROCEDURE — 72192 CT PELVIS W/O DYE: CPT

## 2022-08-02 RX ORDER — HYDROCODONE BITARTRATE AND ACETAMINOPHEN 5; 325 MG/1; MG/1
1 TABLET ORAL ONCE
Status: COMPLETED | OUTPATIENT
Start: 2022-08-02 | End: 2022-08-02

## 2022-08-02 RX ORDER — HYDROCODONE BITARTRATE AND ACETAMINOPHEN 5; 325 MG/1; MG/1
1 TABLET ORAL EVERY 6 HOURS PRN
Qty: 12 TABLET | Refills: 0 | Status: SHIPPED | OUTPATIENT
Start: 2022-08-02 | End: 2023-02-15

## 2022-08-02 RX ADMIN — HYDROCODONE BITARTATE AND ACETAMINOPHEN 1 TABLET: 5; 325 TABLET ORAL at 20:34

## 2022-08-03 NOTE — ED PROVIDER NOTES
Subjective   87-year-old female presents for evaluation of right hip pain.  The patient states that on Saturday she had a mechanical trip and fall and landed awkwardly on her right hip.  No LOC.  She is not anticoagulated.  No neck pain.  She states that she has had progressively worsening pain in her right hip region since that time.  The pain radiates to her groin.  She denies any paresthesias.  She is typically ambulatory with a walker but states that walking has become very difficult secondary to her hip pain, prompting her visit to the emergency department.  She notes that pivoting is difficult.  She currently rates her hip pain at 8 out of 10 in severity and notes that it is worse with attempted movement.          Review of Systems   Musculoskeletal:        Right hip pain   All other systems reviewed and are negative.      Past Medical History:   Diagnosis Date   • Benign hypertension    • Coronary artery disease    • Hypothyroidism    • Osteoarthritis    • Osteoporosis    • Palpitations    • Tobacco abuse     History of tobacco, quitting several years ago.istory of tobacco, quitting several years ago.   • Type 2 diabetes mellitus (CMS/HCC)        Allergies   Allergen Reactions   • Ramipril Cough       Past Surgical History:   Procedure Laterality Date   • BREAST BIOPSY Left     Benign left breast biopsy.   • CATARACT EXTRACTION Right     Right cataract.       Family History   Problem Relation Age of Onset   • Cancer Mother    • Heart attack Father        Social History     Socioeconomic History   • Marital status:    Tobacco Use   • Smoking status: Never Smoker   • Smokeless tobacco: Never Used   Substance and Sexual Activity   • Alcohol use: No   • Drug use: No   • Sexual activity: Defer           Objective   Physical Exam  Vitals and nursing note reviewed.   Constitutional:       General: She is not in acute distress.     Appearance: She is well-developed. She is not diaphoretic.      Comments:  Will see her on 12/31  Patient on amlodipine/metoprolol   Nontoxic-appearing female   HENT:      Head: Normocephalic and atraumatic.   Eyes:      Pupils: Pupils are equal, round, and reactive to light.   Neck:      Comments: No midline cervical spine tenderness present, no step-off or deformity noted  Cardiovascular:      Rate and Rhythm: Normal rate and regular rhythm.      Heart sounds: Normal heart sounds. No murmur heard.    No friction rub. No gallop.   Pulmonary:      Effort: Pulmonary effort is normal. No respiratory distress.      Breath sounds: Normal breath sounds. No wheezing or rales.   Abdominal:      General: Bowel sounds are normal. There is no distension.      Palpations: Abdomen is soft. There is no mass.      Tenderness: There is no abdominal tenderness. There is no guarding or rebound.   Musculoskeletal:      Comments: Range of motion of right hip is limited secondary to pain, no shortening or rotation noted on visual inspection when compared to the left   Skin:     General: Skin is warm and dry.      Findings: No erythema or rash.   Neurological:      Mental Status: She is alert and oriented to person, place, and time.      Comments: Right lower extremity is neurovascularly intact distally with bounding distal pulses and normal sensation   Psychiatric:         Mood and Affect: Mood normal.         Thought Content: Thought content normal.         Judgment: Judgment normal.         Procedures           ED Course  ED Course as of 08/03/22 0155   Tue Aug 02, 2022   1851 87-year-old female presents for evaluation of right hip pain.  She states that on Saturday she had a mechanical trip and fall and landed on her right hip.  No LOC.  She is not anticoagulated.  She notes that she has had gradually worsening pain in her right hip since that time to the point that she is no longer able to bear weight.  As a result, she was brought to the emergency department to be evaluated.  On arrival, the patient is nontoxic-appearing.  Right lower extremity is  neurovascularly intact distally with bounding distal pulses and normal sensation.  Range of motion of right hip is limited secondary to pain. [DD]   1855 NEXUS negative. [DD]   1855 I personally viewed the patient's x-ray images myself, and I am in agreement with the radiologist's reading for final interpretation.     [DD]   1954 CT obtained confirmed x-ray findings.  I had a long discussion with the patient and her family regarding inpatient versus outpatient management.  They would prefer the latter.  I feel that this is a reasonable approach given her living situation and strong support system at home.  Short course of Norco given for pain.  Patient referred to Dr. Jimenez of orthopedics.  She will follow-up within the nexty week.  Agreeable with plan and given appropriate strict return precautions. [DD]      ED Course User Index  [DD] Ricardo Velazquez MD                No results found for this or any previous visit (from the past 24 hour(s)).  Note: In addition to lab results from this visit, the labs listed above may include labs taken at another facility or during a different encounter within the last 24 hours. Please correlate lab times with ED admission and discharge times for further clarification of the services performed during this visit.    CT Pelvis Without Contrast   Final Result   1. Acute fracture involving the right pubic symphysis.   2. No proximal femoral fracture.   3. Moderate bilateral hip osteoarthritis.   4. Additional chronic findings above.       This report was finalized on 8/2/2022 7:33 PM by Clive Haley MD.          XR Hip With or Without Pelvis 2 - 3 View Right   Final Result   1.  Fractures symphysis pubis on the right.   2.  Osseous demineralization.   3.  Degenerative changes are noted involving both hips.   4.  Atherosclerotic change.       This report was finalized on 8/2/2022 5:57 PM by Robert Apodaca MD.            Vitals:    08/02/22 1645 08/02/22 2036   BP: 146/70 155/94  "  BP Location: Left arm Left arm   Patient Position: Sitting Lying   Pulse: 82 81   Resp: 16 18   Temp: 98.1 °F (36.7 °C)    SpO2: 96% 97%   Weight: 65.8 kg (145 lb)    Height: 160 cm (63\")      Medications   HYDROcodone-acetaminophen (NORCO) 5-325 MG per tablet 1 tablet (1 tablet Oral Given 8/2/22 2034)     ECG/EMG Results (last 24 hours)     ** No results found for the last 24 hours. **        No orders to display                          JAREN reviewed by Ricardo Velazquez MD       [facility]    Final diagnoses:   Closed pelvic ring fracture, initial encounter (HCC)       ED Disposition  ED Disposition     ED Disposition   Discharge    Condition   Stable    Comment   --             Ricky Peña MD  [facility] [address] [phone]    In 1 week           Medication List      New Prescriptions    HYDROcodone-acetaminophen 5-325 MG per tablet  Commonly known as: NORCO  Take 1 tablet by mouth Every 6 (Six) Hours As Needed for Moderate Pain .           Where to Get Your Medications      These medications were sent to [id number] - [place], [place] - [address] [place] [address] AT [facility] [address] - [phone]  - [phone]   [address] [facility] [address]    Phone: [phone]   · HYDROcodone-acetaminophen 5-325 MG per tablet          Ricardo Velazquez MD  08/03/22 0157    "

## 2022-08-12 ENCOUNTER — OFFICE VISIT (OUTPATIENT)
Dept: CARDIOLOGY | Facility: CLINIC | Age: 87
End: 2022-08-12

## 2022-08-12 VITALS
BODY MASS INDEX: 24.1 KG/M2 | DIASTOLIC BLOOD PRESSURE: 62 MMHG | HEART RATE: 60 BPM | WEIGHT: 136 LBS | SYSTOLIC BLOOD PRESSURE: 108 MMHG | HEIGHT: 63 IN | OXYGEN SATURATION: 97 %

## 2022-08-12 DIAGNOSIS — I10 BENIGN HYPERTENSION: ICD-10-CM

## 2022-08-12 DIAGNOSIS — R07.2 PRECORDIAL PAIN: ICD-10-CM

## 2022-08-12 DIAGNOSIS — I25.10 CORONARY ARTERY DISEASE INVOLVING NATIVE CORONARY ARTERY OF NATIVE HEART WITHOUT ANGINA PECTORIS: Primary | ICD-10-CM

## 2022-08-12 DIAGNOSIS — E78.2 MIXED HYPERLIPIDEMIA: ICD-10-CM

## 2022-08-12 PROCEDURE — 99214 OFFICE O/P EST MOD 30 MIN: CPT | Performed by: INTERNAL MEDICINE

## 2022-08-12 RX ORDER — NIFEDIPINE 30 MG/1
TABLET, EXTENDED RELEASE ORAL
COMMUNITY
Start: 2022-08-01

## 2022-08-12 RX ORDER — TRAMADOL HYDROCHLORIDE 50 MG/1
50 TABLET ORAL EVERY 6 HOURS PRN
COMMUNITY
Start: 2022-06-23 | End: 2023-02-15

## 2022-08-12 RX ORDER — GLIPIZIDE 5 MG/1
TABLET ORAL
COMMUNITY

## 2022-08-12 NOTE — PROGRESS NOTES
[facility] at [facility]  Office visit  Noé Patton  1935  [phone]    VISIT DATE:  8/12/2022      PCP: Fatimah Mensah MD  [address] [place] [address] [address] [place] [address]    CC:  Chief Complaint   Patient presents with   • Coronary artery disease involving native coronary artery of   • Hypertension   • Palpitations       PROBLEM LIST:  1. Coronary artery disease:  a. ACS status post catheterization, 01/29/2012: Two 3.5 x 28 mm Xience drug-eluting stents of the proximal RCA, EF 30%.  b. Echocardiogram, 03/12/2012: EF 50% to 55%. Mild MR and moderate TR, RVSP at 46, left atrium 3.8 cm.  c. Cessation of Plavix secondary to epistaxis.  d. Echocardiogram 02/09/2016: EF 55% to 60%, mild MR, mild TR, no AS.   e. Nuclear Cardiolite 02/11/2016: No evidence of inducible ischemia, normal LV function.   2. Benign hypertension.  3. Type 2 diabetes mellitus.  4. Hypothyroidism.  5. History of tobacco, quitting several years ago.  6. Family history.  7. Palpitations:   a. Holter monitor 01/21/2016, showing PACs, PVCs, but no arrhythmias or pauses.   8. Osteoarthritis.  9. Surgical history:  a. Benign left breast biopsy.  b. Right cataract      ASSESSMENT:   Diagnosis Plan   1. Coronary artery disease involving native coronary artery of native heart without angina pectoris     2. Benign hypertension     3. Mixed hyperlipidemia         PLAN:  Coronary artery disease: Intermittent angina at rest.  Previously intolerant to calcium channel blockade due to lower extreme edema.  Continue Imdur 30 mg p.o. daily.  Continue aspirin, beta blockade and statin therapy.  [place] scan myocardial perfusion imaging pending for ischemia evaluation.  Transthoracic echocardiogram pending to assess underlying myocardial structure and function.    Hypertension: Goal less than 130/80 mmHg.  Currently controlled.  Well-documented whitecoat hypertension.  Continue current medical therapy.    Hyperlipidemia: Goal LDL less  "than 70.  Currently controlled.  Continue Crestor 10 mg by mouth daily.      Subjective  She has had 2 falls over the previous 10 months.  Report of fall last October with humeral fracture treated at [facility].  Potentially presyncopal event.  Recently had mechanical fall at home resulting in pelvic fracture which is being managed conservatively.  Reports chest discomfort and pressure when she is straining with bowel movements due to constipation.  Blood pressures running less than 130/80 mmHg.  Currently using wheelchair for prolonged ambulation.    PHYSICAL EXAMINATION:  Vitals:    08/12/22 1317   BP: 108/62   BP Location: Right arm   Patient Position: Sitting   Pulse: 60   SpO2: 97%   Weight: 61.7 kg (136 lb)   Height: 160 cm (63\")     General Appearance:    Alert, cooperative, no distress, appears stated age   Head:    Normocephalic, without obvious abnormality, atraumatic   Eyes:    conjunctiva/corneas clear   Nose:   Nares normal, septum midline, mucosa normal, no drainage   Throat:   Lips, teeth and gums normal   Neck:   Supple, symmetrical, trachea midline, no carotid    bruit or JVD   Lungs:     Clear to auscultation bilaterally, respirations unlabored   Chest Wall:    No tenderness or deformity    Heart:    Regular rate and rhythm, S1 and S2 normal, 2/6 early peaking systolic murmur right upper sternal border, rub   or gallop, normal carotid impulse bilaterally without bruit.   Abdomen:     Soft, non-tender   Extremities:   Extremities normal, atraumatic, no cyanosis or edema   Pulses:   2+ and symmetric all extremities   Skin:   Skin color, texture, turgor normal, no rashes or lesions, Spider veins noted on bilateral lower extremities.         Diagnostic Data:  Procedures  No results found for: CHLPL, TRIG, HDL, LDLDIRECT  Lab Results   Component Value Date    BUN 15 12/11/2021    CREATININE 0.70 12/11/2021     12/11/2021    K 3.7 12/11/2021     12/11/2021    CO2 27 12/11/2021 "     Lab Results   Component Value Date    HGBA1C 8.4 (H) 10/28/2021     Lab Results   Component Value Date    WBC 10.85 (H) 12/07/2021    HGB 9.7 (L) 12/07/2021    HCT 29.9 (L) 12/07/2021     12/07/2021       Allergies  Allergies   Allergen Reactions   • Ramipril Cough       Current Medications    Current Outpatient Medications:   •  Accu-Chek Zara Plus test strip, 1 each by Other route Daily. use to test once daily, Disp: , Rfl:   •  aspirin 81 MG tablet, Take 81 mg by mouth Daily., Disp: , Rfl:   •  clobetasol (TEMOVATE) 0.05 % cream, As Needed., Disp: , Rfl:   •  empagliflozin (JARDIANCE) 25 MG tablet tablet, Take 25 mg by mouth Daily., Disp: , Rfl:   •  furosemide (LASIX) 20 MG tablet, Take 20 mg by mouth As Needed., Disp: , Rfl:   •  glipizide (GLUCOTROL) 5 MG tablet, glipizide 5 mg tablet  Take 1 tablet every day by oral route., Disp: , Rfl:   •  HYDROcodone-acetaminophen (NORCO) 5-325 MG per tablet, Take 1 tablet by mouth Every 6 (Six) Hours As Needed for Moderate Pain ., Disp: 12 tablet, Rfl: 0  •  isosorbide mononitrate (IMDUR) 30 MG 24 hr tablet, Take 1 tablet by mouth Daily., Disp: 30 tablet, Rfl: 11  •  losartan (Cozaar) 25 MG tablet, Take 1 tablet by mouth 2 (two) times a day., Disp: 60 tablet, Rfl: 5  •  metFORMIN (GLUCOPHAGE) 1000 MG tablet, Take 1,000 mg by mouth 2 (Two) Times a Day With Meals., Disp: , Rfl:   •  metoprolol tartrate (LOPRESSOR) 25 MG tablet, Take 1 tablet by mouth Every 12 (Twelve) Hours., Disp: 180 tablet, Rfl: 0  •  NIFEdipine XL (PROCARDIA XL) 30 MG 24 hr tablet, TAKE 2 TABLETS BY MOUTH DAILY. DO NOT CRUSH OR CHEW, Disp: , Rfl:   •  rosuvastatin (CRESTOR) 10 MG tablet, Take 1 tablet by mouth Daily., Disp: 30 tablet, Rfl: 3  •  SITagliptin (JANUVIA) 100 MG tablet, Take 100 mg by mouth Daily., Disp: , Rfl:   •  Thyroid 60 MG PO tablet, Take 60 mg by mouth Daily., Disp: , Rfl:   •  traMADol (ULTRAM) 50 MG tablet, Take 50 mg by mouth Every 6 (Six) Hours As Needed. for pain,  Disp: , Rfl:           ROS  Review of Systems   Cardiovascular: Positive for leg swelling. Negative for chest pain, claudication and dyspnea on exertion.   Respiratory: Negative for cough and shortness of breath.          SOCIAL HX  Social History     Socioeconomic History   • Marital status:    Tobacco Use   • Smoking status: Never Smoker   • Smokeless tobacco: Never Used   Substance and Sexual Activity   • Alcohol use: No   • Drug use: No   • Sexual activity: Defer       FAMILY HX  Family History   Problem Relation Age of Onset   • Cancer Mother    • Heart attack Father              Josef Hamilton III, MD, FACC

## 2022-09-20 ENCOUNTER — HOSPITAL ENCOUNTER (OUTPATIENT)
Dept: CARDIOLOGY | Facility: HOSPITAL | Age: 87
Discharge: HOME | End: 2022-09-20

## 2022-09-20 VITALS
HEIGHT: 63 IN | DIASTOLIC BLOOD PRESSURE: 65 MMHG | BODY MASS INDEX: 24.1 KG/M2 | SYSTOLIC BLOOD PRESSURE: 149 MMHG | WEIGHT: 136 LBS

## 2022-09-20 DIAGNOSIS — R07.2 PRECORDIAL PAIN: ICD-10-CM

## 2022-09-20 PROCEDURE — 93306 TTE W/DOPPLER COMPLETE: CPT

## 2022-09-20 PROCEDURE — 93018 CV STRESS TEST I&R ONLY: CPT | Performed by: INTERNAL MEDICINE

## 2022-09-20 PROCEDURE — 78452 HT MUSCLE IMAGE SPECT MULT: CPT

## 2022-09-20 PROCEDURE — 78452 HT MUSCLE IMAGE SPECT MULT: CPT | Performed by: INTERNAL MEDICINE

## 2022-09-20 PROCEDURE — 25010000002 REGADENOSON 0.4 MG/5ML SOLUTION: Performed by: INTERNAL MEDICINE

## 2022-09-20 PROCEDURE — 0 TECHNETIUM SESTAMIBI: Performed by: INTERNAL MEDICINE

## 2022-09-20 PROCEDURE — 93017 CV STRESS TEST TRACING ONLY: CPT

## 2022-09-20 PROCEDURE — A9500 TC99M SESTAMIBI: HCPCS | Performed by: INTERNAL MEDICINE

## 2022-09-20 PROCEDURE — 93306 TTE W/DOPPLER COMPLETE: CPT | Performed by: INTERNAL MEDICINE

## 2022-09-20 RX ADMIN — TECHNETIUM TC 99M SESTAMIBI 1 DOSE: 1 INJECTION INTRAVENOUS at 12:45

## 2022-09-20 RX ADMIN — TECHNETIUM TC 99M SESTAMIBI 1 DOSE: 1 INJECTION INTRAVENOUS at 14:20

## 2022-09-20 RX ADMIN — REGADENOSON 0.4 MG: 0.08 INJECTION, SOLUTION INTRAVENOUS at 14:21

## 2022-09-21 LAB
BH CV ECHO MEAS - AI P1/2T: 292.1 MSEC
BH CV ECHO MEAS - AO MAX PG: 11.4 MMHG
BH CV ECHO MEAS - AO MEAN PG: 6.1 MMHG
BH CV ECHO MEAS - AO ROOT DIAM: 3.2 CM
BH CV ECHO MEAS - AO V2 MAX: 169 CM/SEC
BH CV ECHO MEAS - AO V2 VTI: 31.4 CM
BH CV ECHO MEAS - AVA(I,D): 2.41 CM2
BH CV ECHO MEAS - EDV(CUBED): 37.4 ML
BH CV ECHO MEAS - EDV(MOD-SP2): 61.1 ML
BH CV ECHO MEAS - EDV(MOD-SP4): 77 ML
BH CV ECHO MEAS - EF(MOD-BP): 63.4 %
BH CV ECHO MEAS - EF(MOD-SP2): 67.3 %
BH CV ECHO MEAS - EF(MOD-SP4): 59.6 %
BH CV ECHO MEAS - ESV(CUBED): 7.6 ML
BH CV ECHO MEAS - ESV(MOD-SP2): 20 ML
BH CV ECHO MEAS - ESV(MOD-SP4): 31.1 ML
BH CV ECHO MEAS - FS: 41.1 %
BH CV ECHO MEAS - IVS/LVPW: 1.03 CM
BH CV ECHO MEAS - IVSD: 1.41 CM
BH CV ECHO MEAS - LA DIMENSION: 3.2 CM
BH CV ECHO MEAS - LAT PEAK E' VEL: 7.1 CM/SEC
BH CV ECHO MEAS - LV MASS(C)D: 160.7 GRAMS
BH CV ECHO MEAS - LV MAX PG: 6.8 MMHG
BH CV ECHO MEAS - LV MEAN PG: 3.7 MMHG
BH CV ECHO MEAS - LV V1 MAX: 130.1 CM/SEC
BH CV ECHO MEAS - LV V1 VTI: 25.3 CM
BH CV ECHO MEAS - LVIDD: 3.3 CM
BH CV ECHO MEAS - LVIDS: 1.97 CM
BH CV ECHO MEAS - LVOT AREA: 3 CM2
BH CV ECHO MEAS - LVOT DIAM: 1.95 CM
BH CV ECHO MEAS - LVPWD: 1.37 CM
BH CV ECHO MEAS - MED PEAK E' VEL: 5.4 CM/SEC
BH CV ECHO MEAS - MR MAX PG: 20.2 MMHG
BH CV ECHO MEAS - MR MAX VEL: 224.8 CM/SEC
BH CV ECHO MEAS - MV A MAX VEL: 160.4 CM/SEC
BH CV ECHO MEAS - MV DEC SLOPE: 580.1 CM/SEC2
BH CV ECHO MEAS - MV DEC TIME: 0.2 MSEC
BH CV ECHO MEAS - MV E MAX VEL: 93 CM/SEC
BH CV ECHO MEAS - MV E/A: 0.58
BH CV ECHO MEAS - MV P1/2T: 54.5 MSEC
BH CV ECHO MEAS - MVA(P1/2T): 4 CM2
BH CV ECHO MEAS - PA ACC TIME: 0.08 SEC
BH CV ECHO MEAS - PA PR(ACCEL): 44.5 MMHG
BH CV ECHO MEAS - PA V2 MAX: 133 CM/SEC
BH CV ECHO MEAS - PI END-D VEL: 96.4 CM/SEC
BH CV ECHO MEAS - RAP SYSTOLE: 3 MMHG
BH CV ECHO MEAS - RVSP: 39 MMHG
BH CV ECHO MEAS - SV(LVOT): 75.8 ML
BH CV ECHO MEAS - SV(MOD-SP2): 41.1 ML
BH CV ECHO MEAS - SV(MOD-SP4): 45.9 ML
BH CV ECHO MEAS - TAPSE (>1.6): 2.07 CM
BH CV ECHO MEAS - TR MAX PG: 36 MMHG
BH CV ECHO MEAS - TR MAX VEL: 266.1 CM/SEC
BH CV ECHO MEASUREMENTS AVERAGE E/E' RATIO: 14.88
BH CV REST NUCLEAR ISOTOPE DOSE: 9.7 MCI
BH CV STRESS BP STAGE 2: NORMAL
BH CV STRESS BP STAGE 4: NORMAL
BH CV STRESS COMMENTS STAGE 1: NORMAL
BH CV STRESS DOSE REGADENOSON STAGE 1: 0.4
BH CV STRESS DURATION MIN STAGE 1: 1
BH CV STRESS DURATION MIN STAGE 2: 1
BH CV STRESS DURATION MIN STAGE 3: 1
BH CV STRESS DURATION MIN STAGE 4: 1
BH CV STRESS DURATION SEC STAGE 1: 0
BH CV STRESS DURATION SEC STAGE 2: 0
BH CV STRESS DURATION SEC STAGE 3: 0
BH CV STRESS DURATION SEC STAGE 4: 0
BH CV STRESS HR STAGE 1: 86
BH CV STRESS HR STAGE 2: 111
BH CV STRESS HR STAGE 3: 109
BH CV STRESS HR STAGE 4: 107
BH CV STRESS NUCLEAR ISOTOPE DOSE: 32.9 MCI
BH CV STRESS O2 STAGE 1: 98
BH CV STRESS O2 STAGE 2: 99
BH CV STRESS O2 STAGE 3: 99
BH CV STRESS O2 STAGE 4: 98
BH CV STRESS PROTOCOL 1: NORMAL
BH CV STRESS RECOVERY BP: NORMAL MMHG
BH CV STRESS RECOVERY HR: 100 BPM
BH CV STRESS RECOVERY O2: 98 %
BH CV STRESS STAGE 1: 1
BH CV STRESS STAGE 2: 2
BH CV STRESS STAGE 3: 3
BH CV STRESS STAGE 4: 4
BH CV XLRA - RV BASE: 3 CM
BH CV XLRA - RV LENGTH: 6.8 CM
BH CV XLRA - RV MID: 2.7 CM
BH CV XLRA - TDI S': 18.6 CM/SEC
LEFT ATRIUM VOLUME INDEX: 28.5 ML/M2
LV EF NUC BP: 82 %
MAXIMAL PREDICTED HEART RATE: 133 BPM
MAXIMAL PREDICTED HEART RATE: 133 BPM
PERCENT MAX PREDICTED HR: 85.71 %
STRESS BASELINE BP: NORMAL MMHG
STRESS BASELINE HR: 86 BPM
STRESS O2 SAT REST: 96 %
STRESS PERCENT HR: 101 %
STRESS POST ESTIMATED WORKLOAD: 1 METS
STRESS POST EXERCISE DUR MIN: 4 MIN
STRESS POST EXERCISE DUR SEC: 0 SEC
STRESS POST O2 SAT PEAK: 98 %
STRESS POST PEAK BP: NORMAL MMHG
STRESS POST PEAK HR: 114 BPM
STRESS TARGET HR: 113 BPM
STRESS TARGET HR: 113 BPM

## 2022-09-22 ENCOUNTER — TELEPHONE (OUTPATIENT)
Dept: CARDIOLOGY | Facility: CLINIC | Age: 87
End: 2022-09-22

## 2022-09-22 NOTE — TELEPHONE ENCOUNTER
----- Message from Josef Hamilton III, MD sent at 9/22/2022 11:33 AM EDT -----  Strength of heart muscle looks good on echo and there is no evidence of blockage on stress test.

## 2022-10-26 RX ORDER — ISOSORBIDE MONONITRATE 30 MG/1
30 TABLET, EXTENDED RELEASE ORAL DAILY
Qty: 30 TABLET | Refills: 4 | Status: SHIPPED | OUTPATIENT
Start: 2022-10-26 | End: 2023-02-15

## 2023-02-15 ENCOUNTER — OFFICE VISIT (OUTPATIENT)
Dept: CARDIOLOGY | Facility: CLINIC | Age: 88
End: 2023-02-15
Payer: MEDICARE

## 2023-02-15 VITALS
OXYGEN SATURATION: 96 % | HEIGHT: 63 IN | HEART RATE: 59 BPM | DIASTOLIC BLOOD PRESSURE: 62 MMHG | SYSTOLIC BLOOD PRESSURE: 132 MMHG | WEIGHT: 137.6 LBS | BODY MASS INDEX: 24.38 KG/M2

## 2023-02-15 DIAGNOSIS — I25.10 CORONARY ARTERY DISEASE INVOLVING NATIVE CORONARY ARTERY OF NATIVE HEART WITHOUT ANGINA PECTORIS: Primary | ICD-10-CM

## 2023-02-15 DIAGNOSIS — E78.2 MIXED HYPERLIPIDEMIA: ICD-10-CM

## 2023-02-15 DIAGNOSIS — I10 BENIGN HYPERTENSION: ICD-10-CM

## 2023-02-15 PROCEDURE — 99214 OFFICE O/P EST MOD 30 MIN: CPT | Performed by: INTERNAL MEDICINE

## 2023-02-15 PROCEDURE — 93000 ELECTROCARDIOGRAM COMPLETE: CPT | Performed by: INTERNAL MEDICINE

## 2023-02-15 RX ORDER — PANTOPRAZOLE SODIUM 40 MG/1
40 TABLET, DELAYED RELEASE ORAL DAILY
COMMUNITY

## 2023-02-15 RX ORDER — POTASSIUM CHLORIDE 400 MEQ/1000ML
20 INJECTION, SOLUTION INTRAVENOUS ONCE
COMMUNITY

## 2023-02-15 NOTE — PROGRESS NOTES
[facility]  Office visit  Noé Patton  1935  [phone]    VISIT DATE:  2/15/2023      PCP: Fatimah Mensah MD  [address] [place] [address] [address] [place] [address]    CC:  Chief Complaint   Patient presents with   • Coronary artery disease involving native coronary artery of       PROBLEM LIST:  1. Coronary artery disease:  a. ACS status post catheterization, 01/29/2012: Two 3.5 x 28 mm Xience drug-eluting stents of the proximal RCA, EF 30%.  b. Echocardiogram, 03/12/2012: EF 50% to 55%. Mild MR and moderate TR, RVSP at 46, left atrium 3.8 cm.  c. Cessation of Plavix secondary to epistaxis.  d. Echocardiogram 02/09/2016: EF 55% to 60%, mild MR, mild TR, no AS.   e. Nuclear Cardiolite 02/11/2016: No evidence of inducible ischemia, normal LV function.   2. Benign hypertension.  3. Type 2 diabetes mellitus.  4. Hypothyroidism.  5. History of tobacco, quitting several years ago.  6. Family history.  7. Palpitations:   a. Holter monitor 01/21/2016, showing PACs, PVCs, but no arrhythmias or pauses.   8. Osteoarthritis.  9. Surgical history:  a. Benign left breast biopsy.  b. Right cataract      September 2022  Myocardial perfusion imaging  • Left ventricular ejection fraction is hyperdynamic (Calculated EF > 70%). .  • Myocardial perfusion imaging indicates a normal myocardial perfusion study with no evidence of ischemia.  TTE  • Left ventricular ejection fraction appears to be 61 - 65%. Left ventricular systolic function is normal.  • Left ventricular diastolic function is consistent with (grade Ia w/high LAP) impaired relaxation.  • Left ventricular wall thickness is consistent with mild to moderate concentric hypertrophy. Sigmoid-shaped ventricular septum is present.  • Estimated right ventricular systolic pressure from tricuspid regurgitation is mildly elevated (35-45 mmHg). Calculated right ventricular systolic pressure from tricuspid regurgitation is 39  "mmHg.      ASSESSMENT:   Diagnosis Plan   1. Coronary artery disease involving native coronary artery of native heart without angina pectoris        2. Mixed hyperlipidemia        3. Benign hypertension            PLAN:  Coronary artery disease: Currently asymptomatic, reassuring perfusion imaging recently.  Continue current medical therapy.    Hypertension: Goal less than 130/80 mmHg.  Currently controlled.  Well-documented whitecoat hypertension.  Continue current medical therapy.    Hyperlipidemia: Goal LDL less than 70.  Currently controlled.  Continue Crestor 10 mg by mouth daily.      Subjective    Blood pressures running less than 130/80 mmHg.  Using a wheeled walker at home.  Currently using wheelchair for prolonged ambulation.  Currently denies chest pain, palpitations or dyspnea.    PHYSICAL EXAMINATION:  Vitals:    02/15/23 1311   BP: 132/62   BP Location: Left arm   Patient Position: Sitting   Cuff Size: Adult   Pulse: 59   SpO2: 96%   Weight: 62.4 kg (137 lb 9.6 oz)   Height: 160 cm (63\")     General Appearance:    Alert, cooperative, no distress, appears stated age   Head:    Normocephalic, without obvious abnormality, atraumatic   Eyes:    conjunctiva/corneas clear   Nose:   Nares normal, septum midline, mucosa normal, no drainage   Throat:   Lips, teeth and gums normal   Neck:   Supple, symmetrical, trachea midline, no carotid    bruit or JVD   Lungs:     Clear to auscultation bilaterally, respirations unlabored   Chest Wall:    No tenderness or deformity    Heart:    Regular rate and rhythm, S1 and S2 normal, 2/6 early peaking systolic murmur right upper sternal border, rub   or gallop, normal carotid impulse bilaterally without bruit.   Abdomen:     Soft, non-tender   Extremities:   Extremities normal, atraumatic, no cyanosis or edema   Pulses:   2+ and symmetric all extremities   Skin:   Skin color, texture, turgor normal, no rashes or lesions, Spider veins noted on bilateral lower extremities.   "       Diagnostic Data:    ECG 12 Lead    Date/Time: 2/15/2023 1:28 PM  Performed by: Josef Hamilton III, MD  Authorized by: Josef Hamilton III, MD   Previous ECG: no previous ECG available  Rhythm: sinus bradycardia  QRS axis: left  Other findings: left ventricular hypertrophy    Clinical impression: abnormal EKG          No results found for: CHLPL, TRIG, HDL, LDLDIRECT  Lab Results   Component Value Date    BUN 15 12/11/2021    CREATININE 0.70 12/11/2021     12/11/2021    K 3.7 12/11/2021     12/11/2021    CO2 27 12/11/2021     Lab Results   Component Value Date    HGBA1C 8.4 (H) 10/28/2021     Lab Results   Component Value Date    WBC 10.85 (H) 12/07/2021    HGB 9.7 (L) 12/07/2021    HCT 29.9 (L) 12/07/2021     12/07/2021       Allergies  Allergies   Allergen Reactions   • Amlodipine Swelling   • Ramipril Cough       Current Medications    Current Outpatient Medications:   •  Accu-Chek Zara Plus test strip, 1 each by Other route Daily. use to test once daily, Disp: , Rfl:   •  aspirin 81 MG tablet, Take 81 mg by mouth Daily., Disp: , Rfl:   •  clobetasol (TEMOVATE) 0.05 % cream, As Needed., Disp: , Rfl:   •  dapagliflozin (FARXIGA) 5 MG tablet tablet, Take 5 mg by mouth Daily., Disp: , Rfl:   •  empagliflozin (JARDIANCE) 25 MG tablet tablet, Take 25 mg by mouth Daily., Disp: , Rfl:   •  glipizide (GLUCOTROL) 5 MG tablet, glipizide 5 mg tablet  Take 1 tablet every day by oral route., Disp: , Rfl:   •  losartan (Cozaar) 25 MG tablet, Take 1 tablet by mouth 2 (two) times a day., Disp: 60 tablet, Rfl: 5  •  metoprolol tartrate (LOPRESSOR) 25 MG tablet, Take 1 tablet by mouth Every 12 (Twelve) Hours., Disp: 180 tablet, Rfl: 0  •  NIFEdipine XL (PROCARDIA XL) 30 MG 24 hr tablet, TAKE 2 TABLETS BY MOUTH DAILY. DO NOT CRUSH OR CHEW, Disp: , Rfl:   •  OXYBUTYNIN CHLORIDE PO, Take 5 mg by mouth Daily., Disp: , Rfl:   •  pantoprazole (PROTONIX) 40 MG EC tablet, Take 40 mg by mouth Daily., Disp: , Rfl:   •   potassium chloride 0.4 MEQ/ML, Infuse 20 mEq into a venous catheter 1 (One) Time., Disp: , Rfl:   •  rosuvastatin (CRESTOR) 10 MG tablet, Take 1 tablet by mouth Daily., Disp: 30 tablet, Rfl: 3  •  Thyroid 60 MG PO tablet, Take 60 mg by mouth Daily., Disp: , Rfl:           ROS  Review of Systems   Cardiovascular: Positive for leg swelling. Negative for chest pain, claudication and dyspnea on exertion.   Respiratory: Negative for cough and shortness of breath.          SOCIAL HX  Social History     Socioeconomic History   • Marital status:    Tobacco Use   • Smoking status: Never   • Smokeless tobacco: Never   Vaping Use   • Vaping Use: Never used   Substance and Sexual Activity   • Alcohol use: No   • Drug use: No   • Sexual activity: Defer       FAMILY HX  Family History   Problem Relation Age of Onset   • Cancer Mother    • Heart attack Father              Josef Hamilton III, MD, FACC

## 2023-03-13 ENCOUNTER — TELEPHONE (OUTPATIENT)
Dept: CARDIOLOGY | Facility: CLINIC | Age: 88
End: 2023-03-13
Payer: MEDICARE

## 2023-03-15 RX ORDER — ISOSORBIDE MONONITRATE 30 MG/1
TABLET, EXTENDED RELEASE ORAL
Qty: 30 TABLET | Refills: 3 | Status: SHIPPED | OUTPATIENT
Start: 2023-03-15

## 2023-10-09 ENCOUNTER — OFFICE VISIT (OUTPATIENT)
Dept: CARDIOLOGY | Facility: CLINIC | Age: 88
End: 2023-10-09
Payer: MEDICARE

## 2023-10-09 VITALS
SYSTOLIC BLOOD PRESSURE: 98 MMHG | BODY MASS INDEX: 25.69 KG/M2 | HEART RATE: 77 BPM | HEIGHT: 63 IN | DIASTOLIC BLOOD PRESSURE: 54 MMHG | WEIGHT: 145 LBS | OXYGEN SATURATION: 99 %

## 2023-10-09 DIAGNOSIS — E78.2 MIXED HYPERLIPIDEMIA: ICD-10-CM

## 2023-10-09 DIAGNOSIS — I10 BENIGN HYPERTENSION: Primary | ICD-10-CM

## 2023-10-09 DIAGNOSIS — I25.10 CORONARY ARTERY DISEASE INVOLVING NATIVE CORONARY ARTERY OF NATIVE HEART WITHOUT ANGINA PECTORIS: ICD-10-CM

## 2023-10-09 PROCEDURE — 99214 OFFICE O/P EST MOD 30 MIN: CPT | Performed by: INTERNAL MEDICINE

## 2023-10-09 RX ORDER — SEMAGLUTIDE 0.68 MG/ML
0.25 INJECTION, SOLUTION SUBCUTANEOUS WEEKLY
COMMUNITY
Start: 2023-09-01

## 2023-10-09 NOTE — PROGRESS NOTES
[facility]  Office visit  Noé Patton  1935  [phone]    VISIT DATE:  10/9/2023      PCP: Fatimah Mensah MD  [address] [place] [address] [address] [place] [address]    CC:  Chief Complaint   Patient presents with    Coronary Artery Disease     Coronary artery disease involving native coronary artery of native heart without angina pectoris           PROBLEM LIST:  Coronary artery disease:  ACS status post catheterization, 01/29/2012: Two 3.5 x 28 mm Xience drug-eluting stents of the proximal RCA, EF 30%.  Echocardiogram, 03/12/2012: EF 50% to 55%. Mild MR and moderate TR, RVSP at 46, left atrium 3.8 cm.  Cessation of Plavix secondary to epistaxis.  Echocardiogram 02/09/2016: EF 55% to 60%, mild MR, mild TR, no AS.   Nuclear Cardiolite 02/11/2016: No evidence of inducible ischemia, normal LV function.   Benign hypertension.  Type 2 diabetes mellitus.  Hypothyroidism.  History of tobacco, quitting several years ago.  Family history.  Palpitations:   Holter monitor 01/21/2016, showing PACs, PVCs, but no arrhythmias or pauses.   Osteoarthritis.  Surgical history:  Benign left breast biopsy.  Right cataract      September 2022  Myocardial perfusion imaging  Left ventricular ejection fraction is hyperdynamic (Calculated EF > 70%). .  Myocardial perfusion imaging indicates a normal myocardial perfusion study with no evidence of ischemia.  TTE  Left ventricular ejection fraction appears to be 61 - 65%. Left ventricular systolic function is normal.  Left ventricular diastolic function is consistent with (grade Ia w/high LAP) impaired relaxation.  Left ventricular wall thickness is consistent with mild to moderate concentric hypertrophy. Sigmoid-shaped ventricular septum is present.  Estimated right ventricular systolic pressure from tricuspid regurgitation is mildly elevated (35-45 mmHg). Calculated right ventricular systolic pressure from tricuspid regurgitation is 39  "mmHg.      ASSESSMENT:   Diagnosis Plan   1. Benign hypertension        2. Coronary artery disease involving native coronary artery of native heart without angina pectoris        3. Mixed hyperlipidemia            PLAN:  Coronary artery disease: Currently asymptomatic, reassuring perfusion imaging recently.  Continue current medical therapy.    Hypertension: Goal less than 130/80 mmHg.  Currently controlled.  Well-documented whitecoat hypertension.  Continue current medical therapy.    Hyperlipidemia: Goal LDL less than 70.  Currently controlled.  Continue Crestor 10 mg by mouth daily.    -Have requested medication list from pharmacy for medication reconciliation purposes.    Subjective    Blood pressures running less than 130/80 mmHg.  Using a wheeled walker at home.  Currently using wheelchair for prolonged ambulation.  Currently denies chest pain, palpitations or dyspnea.    PHYSICAL EXAMINATION:  Vitals:    10/09/23 1417   BP: 98/54   BP Location: Left arm   Patient Position: Sitting   Cuff Size: Adult   Pulse: 77   SpO2: 99%   Weight: 65.8 kg (145 lb)   Height: 160 cm (63\")     General Appearance:    Alert, cooperative, no distress, appears stated age   Head:    Normocephalic, without obvious abnormality, atraumatic   Eyes:    conjunctiva/corneas clear   Nose:   Nares normal, septum midline, mucosa normal, no drainage   Throat:   Lips, teeth and gums normal   Neck:   Supple, symmetrical, trachea midline, no carotid    bruit or JVD   Lungs:     Clear to auscultation bilaterally, respirations unlabored   Chest Wall:    No tenderness or deformity    Heart:    Regular rate and rhythm, S1 and S2 normal, 2/6 early peaking systolic murmur right upper sternal border, rub   or gallop, normal carotid impulse bilaterally without bruit.   Abdomen:     Soft, non-tender   Extremities:   Extremities normal, atraumatic, no cyanosis or edema   Pulses:   2+ and symmetric all extremities   Skin:   Skin color, texture, turgor " "normal, no rashes or lesions, Spider veins noted on bilateral lower extremities.         Diagnostic Data:  Procedures  No results found for: \"CHLPL\", \"TRIG\", \"HDL\", \"LDLDIRECT\"  Lab Results   Component Value Date    BUN 15 12/11/2021    CREATININE 0.70 12/11/2021     12/11/2021    K 3.7 12/11/2021     12/11/2021    CO2 27 12/11/2021     Lab Results   Component Value Date    HGBA1C 8.4 (H) 10/28/2021     Lab Results   Component Value Date    WBC 10.85 (H) 12/07/2021    HGB 9.7 (L) 12/07/2021    HCT 29.9 (L) 12/07/2021     12/07/2021       Allergies  Allergies   Allergen Reactions    Amlodipine Swelling    Ramipril Cough       Current Medications    Current Outpatient Medications:     Accu-Chek Zara Plus test strip, 1 each by Other route Daily. use to test once daily, Disp: , Rfl:     aspirin 81 MG tablet, Take 1 tablet by mouth Daily., Disp: , Rfl:     clobetasol (TEMOVATE) 0.05 % cream, As Needed., Disp: , Rfl:     dapagliflozin (FARXIGA) 5 MG tablet tablet, Take 1 tablet by mouth Daily., Disp: , Rfl:     empagliflozin (JARDIANCE) 25 MG tablet tablet, Take 1 tablet by mouth Daily., Disp: , Rfl:     glipizide (GLUCOTROL) 5 MG tablet, glipizide 5 mg tablet  Take 1 tablet every day by oral route., Disp: , Rfl:     isosorbide mononitrate (IMDUR) 30 MG 24 hr tablet, TAKE 1 TABLET BY MOUTH EVERY DAY, Disp: 30 tablet, Rfl: 3    losartan (Cozaar) 25 MG tablet, Take 1 tablet by mouth 2 (two) times a day., Disp: 60 tablet, Rfl: 5    metFORMIN (GLUCOPHAGE) 1000 MG tablet, Take 1 tablet by mouth Daily With Breakfast., Disp: , Rfl:     metoprolol tartrate (LOPRESSOR) 25 MG tablet, Take 1 tablet by mouth Every 12 (Twelve) Hours., Disp: 180 tablet, Rfl: 0    NIFEdipine XL (PROCARDIA XL) 30 MG 24 hr tablet, TAKE 2 TABLETS BY MOUTH DAILY. DO NOT CRUSH OR CHEW, Disp: , Rfl:     OXYBUTYNIN CHLORIDE PO, Take 5 mg by mouth Daily., Disp: , Rfl:     Ozempic, 0.25 or 0.5 MG/DOSE, 2 MG/3ML solution pen-injector, Inject " 0.25 mg under the skin into the appropriate area as directed 1 (One) Time Per Week., Disp: , Rfl:     pantoprazole (PROTONIX) 40 MG EC tablet, Take 1 tablet by mouth Daily., Disp: , Rfl:     potassium chloride 0.4 MEQ/ML, Infuse 50 mL into a venous catheter 1 (One) Time., Disp: , Rfl:     rosuvastatin (CRESTOR) 10 MG tablet, Take 1 tablet by mouth Daily., Disp: 30 tablet, Rfl: 3    Thyroid 60 MG PO tablet, Take 1 tablet by mouth Daily., Disp: , Rfl:           ROS  Review of Systems   Cardiovascular:  Positive for leg swelling. Negative for chest pain, claudication and dyspnea on exertion.   Respiratory:  Negative for cough and shortness of breath.          SOCIAL HX  Social History     Socioeconomic History    Marital status:    Tobacco Use    Smoking status: Never    Smokeless tobacco: Never   Vaping Use    Vaping Use: Never used   Substance and Sexual Activity    Alcohol use: No    Drug use: No    Sexual activity: Defer       FAMILY HX  Family History   Problem Relation Age of Onset    Cancer Mother     Heart attack Father              Josef Hamilton III, MD, FACC

## 2023-11-16 RX ORDER — ISOSORBIDE MONONITRATE 30 MG/1
30 TABLET, EXTENDED RELEASE ORAL DAILY
Qty: 30 TABLET | Refills: 2 | Status: SHIPPED | OUTPATIENT
Start: 2023-11-16

## 2024-01-18 ENCOUNTER — TELEPHONE (OUTPATIENT)
Dept: CARDIOLOGY | Facility: CLINIC | Age: 89
End: 2024-01-18
Payer: MEDICARE

## 2024-01-18 RX ORDER — ISOSORBIDE MONONITRATE 30 MG/1
30 TABLET, EXTENDED RELEASE ORAL DAILY
Qty: 30 TABLET | Refills: 5 | Status: SHIPPED | OUTPATIENT
Start: 2024-01-18

## 2024-01-18 NOTE — TELEPHONE ENCOUNTER
Notified of last refill had needs need lab work for further refills. Called PCP's office and requested recent lab work. Lab work received.Refills sent on Imdur. Verbalized understanding.

## 2024-01-18 NOTE — TELEPHONE ENCOUNTER
Caller: JOLYNN POWELL    Relationship: Emergency Contact    Best call back number: [phone]    What is the best time to reach you: ANYTIME    What was the call regarding: DAUGHTER CALLING IN AND STATES THAT PT HAD A REFILL ON ISOSORBIDE MONONITRATE YESTERDAY AND PHARMACY STATED THAT PT HAD TO HAVE APPT BEFORE PT COULD REFILL AGAIN FOR NEXT MONTH. PT WAS LAST SEEN IN OCTOBER OF 2023 AND IS NOT DUE FOR 1 YEAR FU UNTIL OCTOBER 2024. PLEASE CALL BACK TO ADVISE, THANK YOU.

## 2024-08-01 RX ORDER — ISOSORBIDE MONONITRATE 30 MG/1
30 TABLET, EXTENDED RELEASE ORAL DAILY
Qty: 30 TABLET | Refills: 2 | Status: SHIPPED | OUTPATIENT
Start: 2024-08-01

## 2024-11-13 RX ORDER — ISOSORBIDE MONONITRATE 30 MG/1
30 TABLET, EXTENDED RELEASE ORAL DAILY
Qty: 30 TABLET | Refills: 1 | OUTPATIENT
Start: 2024-11-13

## 2024-11-19 RX ORDER — ISOSORBIDE MONONITRATE 30 MG/1
30 TABLET, EXTENDED RELEASE ORAL DAILY
Qty: 30 TABLET | Refills: 1 | Status: SHIPPED | OUTPATIENT
Start: 2024-11-19

## 2025-01-10 RX ORDER — ISOSORBIDE MONONITRATE 30 MG/1
30 TABLET, EXTENDED RELEASE ORAL DAILY
Qty: 30 TABLET | Refills: 0 | Status: SHIPPED | OUTPATIENT
Start: 2025-01-10

## 2025-01-14 ENCOUNTER — OFFICE VISIT (OUTPATIENT)
Dept: CARDIOLOGY | Facility: CLINIC | Age: OVER 89
End: 2025-01-14
Payer: MEDICARE

## 2025-01-14 VITALS — SYSTOLIC BLOOD PRESSURE: 118 MMHG | DIASTOLIC BLOOD PRESSURE: 62 MMHG | HEART RATE: 76 BPM | OXYGEN SATURATION: 93 %

## 2025-01-14 DIAGNOSIS — I25.10 CORONARY ARTERY DISEASE INVOLVING NATIVE CORONARY ARTERY OF NATIVE HEART WITHOUT ANGINA PECTORIS: ICD-10-CM

## 2025-01-14 DIAGNOSIS — R00.2 PALPITATIONS: Primary | ICD-10-CM

## 2025-01-14 DIAGNOSIS — I10 BENIGN HYPERTENSION: ICD-10-CM

## 2025-01-14 PROCEDURE — 99214 OFFICE O/P EST MOD 30 MIN: CPT | Performed by: PHYSICIAN ASSISTANT

## 2025-01-14 RX ORDER — DONEPEZIL HYDROCHLORIDE 10 MG/1
10 TABLET, FILM COATED ORAL
COMMUNITY
Start: 2025-01-04 | End: 2026-01-04

## 2025-01-14 RX ORDER — FUROSEMIDE 20 MG/1
20 TABLET ORAL AS NEEDED
Qty: 90 TABLET | Refills: 3 | Status: SHIPPED | OUTPATIENT
Start: 2025-01-14

## 2025-01-14 RX ORDER — MIRABEGRON 50 MG/1
50 TABLET, FILM COATED, EXTENDED RELEASE ORAL DAILY
COMMUNITY
Start: 2024-07-23 | End: 2025-07-23

## 2025-01-14 NOTE — PROGRESS NOTES
[facility]   OFFICE NOTE      Noé Patton  1935  PCP: Fatimah Mensah MD    SUBJECTIVE:   Noé Patton is a 89 y.o. female seen for a follow up visit regarding the following:     CC:Afib.     HPI:   This is a pleasant 89-year-old female who comes with her caregiver today presents for follow-up regarding coronary disease hypertension diastolic dysfunction lower extremity edema.  She was last seen by Dr. Hamilton a year ago.  She follows with her primary care provider and for sales Dr. Lewis.  She states that she feels good today she has had no chest pain worsening shortness of breath.  She has had trouble taking diuretics in the past due to inability to get to the bathroom in time.  She does have lower extreme edema but is not interested in lower extremity compression stockings as she does not like to take diuretics on regular basis.  Her caregiver states she is quite sedentary and stays in the house almost all the time.    Cardiac PMH: (Old records have been reviewed and summarized below)    Coronary artery disease:  ACS status post catheterization, 01/29/2012: Two 3.5 x 28 mm Xience drug-eluting stents of the proximal RCA, EF 30%.  Echocardiogram, 03/12/2012: EF 50% to 55%. Mild MR and moderate TR, RVSP at 46, left atrium 3.8 cm.  Cessation of Plavix secondary to epistaxis.  Echocardiogram 02/09/2016: EF 55% to 60%, mild MR, mild TR, no AS.   Nuclear Cardiolite 02/11/2016: No evidence of inducible ischemia, normal LV function.   Echocardiogram EF 65%, Mild diastolic dysfunction  MPS EF 70%, 2022  Benign hypertension.  Type 2 diabetes mellitus.  Hypothyroidism.  History of tobacco, quitting several years ago.  Family history.  Palpitations:   Holter monitor 01/21/2016, showing PACs, PVCs, but no arrhythmias or pauses.   Osteoarthritis.  Surgical history:  Benign left breast biopsy.  Right cataract.    Past Medical History, Past Surgical History,  Family history, Social History, and Medications were all reviewed with the patient today and updated as necessary.       Current Outpatient Medications:     Accu-Chek Zara Plus test strip, 1 each by Other route Daily. use to test once daily, Disp: , Rfl:     aspirin 81 MG tablet, Take 1 tablet by mouth Daily., Disp: , Rfl:     donepezil (ARICEPT) 10 MG tablet, Take 1 tablet by mouth., Disp: , Rfl:     glipizide (GLUCOTROL) 5 MG tablet, glipizide 5 mg tablet  Take 1 tablet every day by oral route., Disp: , Rfl:     isosorbide mononitrate (IMDUR) 30 MG 24 hr tablet, TAKE 1 TABLET BY MOUTH ONCE DAILY, Disp: 30 tablet, Rfl: 0    losartan (Cozaar) 25 MG tablet, Take 1 tablet by mouth 2 (two) times a day., Disp: 60 tablet, Rfl: 5    metFORMIN (GLUCOPHAGE) 1000 MG tablet, Take 1 tablet by mouth Daily With Breakfast., Disp: , Rfl:     metoprolol tartrate (LOPRESSOR) 25 MG tablet, Take 1 tablet by mouth Every 12 (Twelve) Hours., Disp: 180 tablet, Rfl: 0    Mirabegron ER (MYRBETRIQ) 50 MG tablet sustained-release 24 hour 24 hr tablet, Take 50 mg by mouth Daily., Disp: , Rfl:     NIFEdipine XL (PROCARDIA XL) 30 MG 24 hr tablet, TAKE 2 TABLETS BY MOUTH DAILY. DO NOT CRUSH OR CHEW, Disp: , Rfl:     rosuvastatin (CRESTOR) 10 MG tablet, Take 1 tablet by mouth Daily., Disp: 30 tablet, Rfl: 3    Thyroid 60 MG PO tablet, Take 1 tablet by mouth Daily., Disp: , Rfl:     clobetasol (TEMOVATE) 0.05 % cream, As Needed. (Patient not taking: Reported on 1/14/2025), Disp: , Rfl:     dapagliflozin (FARXIGA) 5 MG tablet tablet, Take 1 tablet by mouth Daily. (Patient not taking: Reported on 1/14/2025), Disp: , Rfl:     empagliflozin (JARDIANCE) 25 MG tablet tablet, Take 1 tablet by mouth Daily. (Patient not taking: Reported on 1/14/2025), Disp: , Rfl:     Ozempic, 0.25 or 0.5 MG/DOSE, 2 MG/3ML solution pen-injector, Inject 0.25 mg under the skin into the appropriate area as directed 1 (One) Time Per Week. (Patient not taking: Reported on  1/14/2025), Disp: , Rfl:     pantoprazole (PROTONIX) 40 MG EC tablet, Take 1 tablet by mouth Daily. (Patient not taking: Reported on 1/14/2025), Disp: , Rfl:     potassium chloride 0.4 MEQ/ML, Infuse 50 mL into a venous catheter 1 (One) Time. (Patient not taking: Reported on 1/14/2025), Disp: , Rfl:       Allergies   Allergen Reactions    Amlodipine Swelling    Ramipril Cough         PHYSICAL EXAM:    /62 (BP Location: Left arm, Patient Position: Sitting)   Pulse 76   SpO2 93%        Wt Readings from Last 5 Encounters:   10/09/23 65.8 kg (145 lb)   02/15/23 62.4 kg (137 lb 9.6 oz)   09/20/22 61.7 kg (136 lb)   08/12/22 61.7 kg (136 lb)   08/02/22 65.8 kg (145 lb)       BP Readings from Last 5 Encounters:   01/14/25 118/62   10/09/23 98/54   02/15/23 132/62   09/20/22 149/65   08/12/22 108/62       General appearance - Alert, well appearing, and in no distress   Mental status - Affect appropriate to mood.  Eyes - Sclerae anicteric,  ENMT - Hearing grossly normal bilaterally, Dental hygiene good.  Neck - Carotids upstroke normal bilaterally, no bruits, no JVD.  Resp - Clear to auscultation, no wheezes, rales or rhonchi, symmetric air entry.  Heart - Normal rate, regular rhythm, normal S1, S2, no murmurs, rubs, clicks or gallops.  GI - Soft, nontender, nondistended, no masses or organomegaly.  Neurological - Grossly intact - normal speech, no focal findings  Musculoskeletal - No joint tenderness, deformity or swelling, no muscular tenderness noted.  Extremities -positive 2+ edema no clubbing or or cyanosis.  Skin - Normal coloration and turgor.  Psych -  oriented to person, place, and time.    Medical problems and test results were reviewed with the patient today.         ASSESSMENT   1.  Coronary disease: Negative stress test 2022, no ongoing anginal symptoms.    2.  Lower extreme edema combination of sedentary lifestyle,  venous insufficiency may be some diastolic dysfunction.  She declines ttaking daily  diuretics due to inability to get to the bathroom.  She will take as needed Lasix.    4.  Hypertension: Well-controlled current medical therapy    PLAN  Overall stable course she is agreeable take Lasix as needed occasionally otherwise continue current medical therapy return follow-up Dr. Hamilton as scheduled 1 year or sooner as needed.            1/14/2025  11:52 EST  Electronically signed by CHIQUITA Garcia, 01/14/25, 12:51 PM EST.

## 2025-01-31 RX ORDER — ISOSORBIDE MONONITRATE 30 MG/1
30 TABLET, EXTENDED RELEASE ORAL DAILY
Qty: 30 TABLET | Refills: 1 | Status: SHIPPED | OUTPATIENT
Start: 2025-01-31

## 2025-04-17 RX ORDER — ISOSORBIDE MONONITRATE 30 MG/1
30 TABLET, EXTENDED RELEASE ORAL DAILY
Qty: 30 TABLET | Refills: 6 | Status: SHIPPED | OUTPATIENT
Start: 2025-04-17